# Patient Record
Sex: MALE | Race: OTHER | HISPANIC OR LATINO | URBAN - METROPOLITAN AREA
[De-identification: names, ages, dates, MRNs, and addresses within clinical notes are randomized per-mention and may not be internally consistent; named-entity substitution may affect disease eponyms.]

---

## 2023-11-22 ENCOUNTER — INPATIENT (INPATIENT)
Facility: HOSPITAL | Age: 68
LOS: 1 days | Discharge: HOME | DRG: 551 | End: 2023-11-24
Attending: STUDENT IN AN ORGANIZED HEALTH CARE EDUCATION/TRAINING PROGRAM | Admitting: STUDENT IN AN ORGANIZED HEALTH CARE EDUCATION/TRAINING PROGRAM
Payer: COMMERCIAL

## 2023-11-22 VITALS
TEMPERATURE: 99 F | OXYGEN SATURATION: 97 % | DIASTOLIC BLOOD PRESSURE: 89 MMHG | HEART RATE: 71 BPM | RESPIRATION RATE: 18 BRPM | WEIGHT: 179.9 LBS | SYSTOLIC BLOOD PRESSURE: 146 MMHG

## 2023-11-22 DIAGNOSIS — I10 ESSENTIAL (PRIMARY) HYPERTENSION: ICD-10-CM

## 2023-11-22 DIAGNOSIS — R26.2 DIFFICULTY IN WALKING, NOT ELSEWHERE CLASSIFIED: ICD-10-CM

## 2023-11-22 DIAGNOSIS — E78.5 HYPERLIPIDEMIA, UNSPECIFIED: ICD-10-CM

## 2023-11-22 DIAGNOSIS — N17.9 ACUTE KIDNEY FAILURE, UNSPECIFIED: ICD-10-CM

## 2023-11-22 DIAGNOSIS — E11.9 TYPE 2 DIABETES MELLITUS WITHOUT COMPLICATIONS: ICD-10-CM

## 2023-11-22 DIAGNOSIS — I63.9 CEREBRAL INFARCTION, UNSPECIFIED: ICD-10-CM

## 2023-11-22 DIAGNOSIS — R53.1 WEAKNESS: ICD-10-CM

## 2023-11-22 DIAGNOSIS — R10.9 UNSPECIFIED ABDOMINAL PAIN: ICD-10-CM

## 2023-11-22 DIAGNOSIS — Z29.9 ENCOUNTER FOR PROPHYLACTIC MEASURES, UNSPECIFIED: ICD-10-CM

## 2023-11-22 DIAGNOSIS — N20.0 CALCULUS OF KIDNEY: ICD-10-CM

## 2023-11-22 DIAGNOSIS — M54.9 DORSALGIA, UNSPECIFIED: ICD-10-CM

## 2023-11-22 LAB
ACETONE SERPL-MCNC: NEGATIVE — SIGNIFICANT CHANGE UP
ACETONE SERPL-MCNC: NEGATIVE — SIGNIFICANT CHANGE UP
ALBUMIN SERPL ELPH-MCNC: 3.6 G/DL — SIGNIFICANT CHANGE UP (ref 3.5–5)
ALBUMIN SERPL ELPH-MCNC: 3.6 G/DL — SIGNIFICANT CHANGE UP (ref 3.5–5)
ALP SERPL-CCNC: 161 U/L — HIGH (ref 40–120)
ALP SERPL-CCNC: 161 U/L — HIGH (ref 40–120)
ALT FLD-CCNC: 66 U/L DA — HIGH (ref 10–60)
ALT FLD-CCNC: 66 U/L DA — HIGH (ref 10–60)
ANION GAP SERPL CALC-SCNC: 4 MMOL/L — LOW (ref 5–17)
ANION GAP SERPL CALC-SCNC: 4 MMOL/L — LOW (ref 5–17)
APPEARANCE UR: CLEAR — SIGNIFICANT CHANGE UP
APPEARANCE UR: CLEAR — SIGNIFICANT CHANGE UP
AST SERPL-CCNC: 56 U/L — HIGH (ref 10–40)
AST SERPL-CCNC: 56 U/L — HIGH (ref 10–40)
BACTERIA # UR AUTO: ABNORMAL /HPF
BACTERIA # UR AUTO: ABNORMAL /HPF
BASOPHILS # BLD AUTO: 0.04 K/UL — SIGNIFICANT CHANGE UP (ref 0–0.2)
BASOPHILS # BLD AUTO: 0.04 K/UL — SIGNIFICANT CHANGE UP (ref 0–0.2)
BASOPHILS NFR BLD AUTO: 0.7 % — SIGNIFICANT CHANGE UP (ref 0–2)
BASOPHILS NFR BLD AUTO: 0.7 % — SIGNIFICANT CHANGE UP (ref 0–2)
BILIRUB SERPL-MCNC: 0.5 MG/DL — SIGNIFICANT CHANGE UP (ref 0.2–1.2)
BILIRUB SERPL-MCNC: 0.5 MG/DL — SIGNIFICANT CHANGE UP (ref 0.2–1.2)
BILIRUB UR-MCNC: NEGATIVE — SIGNIFICANT CHANGE UP
BILIRUB UR-MCNC: NEGATIVE — SIGNIFICANT CHANGE UP
BUN SERPL-MCNC: 33 MG/DL — HIGH (ref 7–18)
BUN SERPL-MCNC: 33 MG/DL — HIGH (ref 7–18)
CALCIUM SERPL-MCNC: 9 MG/DL — SIGNIFICANT CHANGE UP (ref 8.4–10.5)
CALCIUM SERPL-MCNC: 9 MG/DL — SIGNIFICANT CHANGE UP (ref 8.4–10.5)
CHLORIDE SERPL-SCNC: 105 MMOL/L — SIGNIFICANT CHANGE UP (ref 96–108)
CHLORIDE SERPL-SCNC: 105 MMOL/L — SIGNIFICANT CHANGE UP (ref 96–108)
CK SERPL-CCNC: 255 U/L — HIGH (ref 35–232)
CK SERPL-CCNC: 255 U/L — HIGH (ref 35–232)
CO2 SERPL-SCNC: 27 MMOL/L — SIGNIFICANT CHANGE UP (ref 22–31)
CO2 SERPL-SCNC: 27 MMOL/L — SIGNIFICANT CHANGE UP (ref 22–31)
COLOR SPEC: YELLOW — SIGNIFICANT CHANGE UP
COLOR SPEC: YELLOW — SIGNIFICANT CHANGE UP
COMMENT - URINE: SIGNIFICANT CHANGE UP
COMMENT - URINE: SIGNIFICANT CHANGE UP
CREAT SERPL-MCNC: 2.07 MG/DL — HIGH (ref 0.5–1.3)
CREAT SERPL-MCNC: 2.07 MG/DL — HIGH (ref 0.5–1.3)
DIFF PNL FLD: ABNORMAL
DIFF PNL FLD: ABNORMAL
EGFR: 34 ML/MIN/1.73M2 — LOW
EGFR: 34 ML/MIN/1.73M2 — LOW
EOSINOPHIL # BLD AUTO: 0.11 K/UL — SIGNIFICANT CHANGE UP (ref 0–0.5)
EOSINOPHIL # BLD AUTO: 0.11 K/UL — SIGNIFICANT CHANGE UP (ref 0–0.5)
EOSINOPHIL NFR BLD AUTO: 1.8 % — SIGNIFICANT CHANGE UP (ref 0–6)
EOSINOPHIL NFR BLD AUTO: 1.8 % — SIGNIFICANT CHANGE UP (ref 0–6)
EPI CELLS # UR: PRESENT
EPI CELLS # UR: PRESENT
GLUCOSE BLDC GLUCOMTR-MCNC: 132 MG/DL — HIGH (ref 70–99)
GLUCOSE BLDC GLUCOMTR-MCNC: 132 MG/DL — HIGH (ref 70–99)
GLUCOSE SERPL-MCNC: 76 MG/DL — SIGNIFICANT CHANGE UP (ref 70–99)
GLUCOSE SERPL-MCNC: 76 MG/DL — SIGNIFICANT CHANGE UP (ref 70–99)
GLUCOSE UR QL: >=1000 MG/DL
GLUCOSE UR QL: >=1000 MG/DL
GRAN CASTS # UR COMP ASSIST: PRESENT
GRAN CASTS # UR COMP ASSIST: PRESENT
HCT VFR BLD CALC: 39.3 % — SIGNIFICANT CHANGE UP (ref 39–50)
HCT VFR BLD CALC: 39.3 % — SIGNIFICANT CHANGE UP (ref 39–50)
HGB BLD-MCNC: 13.3 G/DL — SIGNIFICANT CHANGE UP (ref 13–17)
HGB BLD-MCNC: 13.3 G/DL — SIGNIFICANT CHANGE UP (ref 13–17)
HYALINE CASTS # UR AUTO: PRESENT
HYALINE CASTS # UR AUTO: PRESENT
IMM GRANULOCYTES NFR BLD AUTO: 0.5 % — SIGNIFICANT CHANGE UP (ref 0–0.9)
IMM GRANULOCYTES NFR BLD AUTO: 0.5 % — SIGNIFICANT CHANGE UP (ref 0–0.9)
KETONES UR-MCNC: ABNORMAL MG/DL
KETONES UR-MCNC: ABNORMAL MG/DL
LEUKOCYTE ESTERASE UR-ACNC: NEGATIVE — SIGNIFICANT CHANGE UP
LEUKOCYTE ESTERASE UR-ACNC: NEGATIVE — SIGNIFICANT CHANGE UP
LYMPHOCYTES # BLD AUTO: 1.51 K/UL — SIGNIFICANT CHANGE UP (ref 1–3.3)
LYMPHOCYTES # BLD AUTO: 1.51 K/UL — SIGNIFICANT CHANGE UP (ref 1–3.3)
LYMPHOCYTES # BLD AUTO: 25.3 % — SIGNIFICANT CHANGE UP (ref 13–44)
LYMPHOCYTES # BLD AUTO: 25.3 % — SIGNIFICANT CHANGE UP (ref 13–44)
MAGNESIUM SERPL-MCNC: 2.1 MG/DL — SIGNIFICANT CHANGE UP (ref 1.6–2.6)
MAGNESIUM SERPL-MCNC: 2.1 MG/DL — SIGNIFICANT CHANGE UP (ref 1.6–2.6)
MCHC RBC-ENTMCNC: 28.5 PG — SIGNIFICANT CHANGE UP (ref 27–34)
MCHC RBC-ENTMCNC: 28.5 PG — SIGNIFICANT CHANGE UP (ref 27–34)
MCHC RBC-ENTMCNC: 33.8 GM/DL — SIGNIFICANT CHANGE UP (ref 32–36)
MCHC RBC-ENTMCNC: 33.8 GM/DL — SIGNIFICANT CHANGE UP (ref 32–36)
MCV RBC AUTO: 84.2 FL — SIGNIFICANT CHANGE UP (ref 80–100)
MCV RBC AUTO: 84.2 FL — SIGNIFICANT CHANGE UP (ref 80–100)
MONOCYTES # BLD AUTO: 0.94 K/UL — HIGH (ref 0–0.9)
MONOCYTES # BLD AUTO: 0.94 K/UL — HIGH (ref 0–0.9)
MONOCYTES NFR BLD AUTO: 15.7 % — HIGH (ref 2–14)
MONOCYTES NFR BLD AUTO: 15.7 % — HIGH (ref 2–14)
NEUTROPHILS # BLD AUTO: 3.35 K/UL — SIGNIFICANT CHANGE UP (ref 1.8–7.4)
NEUTROPHILS # BLD AUTO: 3.35 K/UL — SIGNIFICANT CHANGE UP (ref 1.8–7.4)
NEUTROPHILS NFR BLD AUTO: 56 % — SIGNIFICANT CHANGE UP (ref 43–77)
NEUTROPHILS NFR BLD AUTO: 56 % — SIGNIFICANT CHANGE UP (ref 43–77)
NITRITE UR-MCNC: NEGATIVE — SIGNIFICANT CHANGE UP
NITRITE UR-MCNC: NEGATIVE — SIGNIFICANT CHANGE UP
NRBC # BLD: 0 /100 WBCS — SIGNIFICANT CHANGE UP (ref 0–0)
NRBC # BLD: 0 /100 WBCS — SIGNIFICANT CHANGE UP (ref 0–0)
OSMOLALITY SERPL: 292 MOSMOL/KG — SIGNIFICANT CHANGE UP (ref 280–301)
OSMOLALITY SERPL: 292 MOSMOL/KG — SIGNIFICANT CHANGE UP (ref 280–301)
PH UR: 5 — SIGNIFICANT CHANGE UP (ref 5–8)
PH UR: 5 — SIGNIFICANT CHANGE UP (ref 5–8)
PLATELET # BLD AUTO: 117 K/UL — LOW (ref 150–400)
PLATELET # BLD AUTO: 117 K/UL — LOW (ref 150–400)
POTASSIUM SERPL-MCNC: 4.3 MMOL/L — SIGNIFICANT CHANGE UP (ref 3.5–5.3)
POTASSIUM SERPL-MCNC: 4.3 MMOL/L — SIGNIFICANT CHANGE UP (ref 3.5–5.3)
POTASSIUM SERPL-SCNC: 4.3 MMOL/L — SIGNIFICANT CHANGE UP (ref 3.5–5.3)
POTASSIUM SERPL-SCNC: 4.3 MMOL/L — SIGNIFICANT CHANGE UP (ref 3.5–5.3)
PROT SERPL-MCNC: 6.8 G/DL — SIGNIFICANT CHANGE UP (ref 6–8.3)
PROT SERPL-MCNC: 6.8 G/DL — SIGNIFICANT CHANGE UP (ref 6–8.3)
PROT UR-MCNC: 300 MG/DL
PROT UR-MCNC: 300 MG/DL
RBC # BLD: 4.67 M/UL — SIGNIFICANT CHANGE UP (ref 4.2–5.8)
RBC # BLD: 4.67 M/UL — SIGNIFICANT CHANGE UP (ref 4.2–5.8)
RBC # FLD: 13.7 % — SIGNIFICANT CHANGE UP (ref 10.3–14.5)
RBC # FLD: 13.7 % — SIGNIFICANT CHANGE UP (ref 10.3–14.5)
RBC CASTS # UR COMP ASSIST: 25 /HPF — HIGH (ref 0–4)
RBC CASTS # UR COMP ASSIST: 25 /HPF — HIGH (ref 0–4)
SODIUM SERPL-SCNC: 136 MMOL/L — SIGNIFICANT CHANGE UP (ref 135–145)
SODIUM SERPL-SCNC: 136 MMOL/L — SIGNIFICANT CHANGE UP (ref 135–145)
SP GR SPEC: 1.02 — SIGNIFICANT CHANGE UP (ref 1–1.03)
SP GR SPEC: 1.02 — SIGNIFICANT CHANGE UP (ref 1–1.03)
TROPONIN I, HIGH SENSITIVITY RESULT: 35.5 NG/L — SIGNIFICANT CHANGE UP
TROPONIN I, HIGH SENSITIVITY RESULT: 35.5 NG/L — SIGNIFICANT CHANGE UP
UROBILINOGEN FLD QL: 0.2 E.U./DL — SIGNIFICANT CHANGE UP (ref 0.2–1)
UROBILINOGEN FLD QL: 0.2 E.U./DL — SIGNIFICANT CHANGE UP (ref 0.2–1)
WBC # BLD: 5.98 K/UL — SIGNIFICANT CHANGE UP (ref 3.8–10.5)
WBC # BLD: 5.98 K/UL — SIGNIFICANT CHANGE UP (ref 3.8–10.5)
WBC # FLD AUTO: 5.98 K/UL — SIGNIFICANT CHANGE UP (ref 3.8–10.5)
WBC # FLD AUTO: 5.98 K/UL — SIGNIFICANT CHANGE UP (ref 3.8–10.5)
WBC UR QL: 5 /HPF — SIGNIFICANT CHANGE UP (ref 0–5)
WBC UR QL: 5 /HPF — SIGNIFICANT CHANGE UP (ref 0–5)

## 2023-11-22 PROCEDURE — 72131 CT LUMBAR SPINE W/O DYE: CPT | Mod: 26,MA

## 2023-11-22 PROCEDURE — 71045 X-RAY EXAM CHEST 1 VIEW: CPT | Mod: 26

## 2023-11-22 PROCEDURE — 73620 X-RAY EXAM OF FOOT: CPT | Mod: 26,RT

## 2023-11-22 PROCEDURE — 99223 1ST HOSP IP/OBS HIGH 75: CPT | Mod: GC

## 2023-11-22 PROCEDURE — 99285 EMERGENCY DEPT VISIT HI MDM: CPT

## 2023-11-22 RX ORDER — INSULIN LISPRO 100/ML
VIAL (ML) SUBCUTANEOUS
Refills: 0 | Status: DISCONTINUED | OUTPATIENT
Start: 2023-11-22 | End: 2023-11-24

## 2023-11-22 RX ORDER — NIFEDIPINE 30 MG
1 TABLET, EXTENDED RELEASE 24 HR ORAL
Refills: 0 | DISCHARGE

## 2023-11-22 RX ORDER — NIFEDIPINE 30 MG
90 TABLET, EXTENDED RELEASE 24 HR ORAL DAILY
Refills: 0 | Status: DISCONTINUED | OUTPATIENT
Start: 2023-11-22 | End: 2023-11-22

## 2023-11-22 RX ORDER — ATORVASTATIN CALCIUM 80 MG/1
1 TABLET, FILM COATED ORAL
Refills: 0 | DISCHARGE

## 2023-11-22 RX ORDER — INSULIN GLARGINE 100 [IU]/ML
10 INJECTION, SOLUTION SUBCUTANEOUS AT BEDTIME
Refills: 0 | Status: DISCONTINUED | OUTPATIENT
Start: 2023-11-22 | End: 2023-11-23

## 2023-11-22 RX ORDER — CLOPIDOGREL BISULFATE 75 MG/1
1 TABLET, FILM COATED ORAL
Refills: 0 | DISCHARGE

## 2023-11-22 RX ORDER — ONDANSETRON 8 MG/1
4 TABLET, FILM COATED ORAL ONCE
Refills: 0 | Status: COMPLETED | OUTPATIENT
Start: 2023-11-22 | End: 2023-11-22

## 2023-11-22 RX ORDER — CLOPIDOGREL BISULFATE 75 MG/1
75 TABLET, FILM COATED ORAL DAILY
Refills: 0 | Status: DISCONTINUED | OUTPATIENT
Start: 2023-11-22 | End: 2023-11-24

## 2023-11-22 RX ORDER — ENOXAPARIN SODIUM 100 MG/ML
40 INJECTION SUBCUTANEOUS EVERY 24 HOURS
Refills: 0 | Status: DISCONTINUED | OUTPATIENT
Start: 2023-11-22 | End: 2023-11-24

## 2023-11-22 RX ORDER — INSULIN GLARGINE 100 [IU]/ML
10 INJECTION, SOLUTION SUBCUTANEOUS
Refills: 0 | DISCHARGE

## 2023-11-22 RX ORDER — LOSARTAN POTASSIUM 100 MG/1
1 TABLET, FILM COATED ORAL
Refills: 0 | DISCHARGE

## 2023-11-22 RX ORDER — ACETAMINOPHEN 500 MG
650 TABLET ORAL EVERY 6 HOURS
Refills: 0 | Status: DISCONTINUED | OUTPATIENT
Start: 2023-11-22 | End: 2023-11-24

## 2023-11-22 RX ORDER — ATORVASTATIN CALCIUM 80 MG/1
80 TABLET, FILM COATED ORAL AT BEDTIME
Refills: 0 | Status: DISCONTINUED | OUTPATIENT
Start: 2023-11-22 | End: 2023-11-24

## 2023-11-22 RX ORDER — MORPHINE SULFATE 50 MG/1
4 CAPSULE, EXTENDED RELEASE ORAL ONCE
Refills: 0 | Status: DISCONTINUED | OUTPATIENT
Start: 2023-11-22 | End: 2023-11-22

## 2023-11-22 RX ORDER — SODIUM CHLORIDE 9 MG/ML
1000 INJECTION, SOLUTION INTRAVENOUS
Refills: 0 | Status: DISCONTINUED | OUTPATIENT
Start: 2023-11-22 | End: 2023-11-23

## 2023-11-22 RX ORDER — NIFEDIPINE 30 MG
90 TABLET, EXTENDED RELEASE 24 HR ORAL DAILY
Refills: 0 | Status: DISCONTINUED | OUTPATIENT
Start: 2023-11-22 | End: 2023-11-24

## 2023-11-22 RX ORDER — TAMSULOSIN HYDROCHLORIDE 0.4 MG/1
0.4 CAPSULE ORAL AT BEDTIME
Refills: 0 | Status: DISCONTINUED | OUTPATIENT
Start: 2023-11-22 | End: 2023-11-24

## 2023-11-22 RX ORDER — EMPAGLIFLOZIN 10 MG/1
1 TABLET, FILM COATED ORAL
Refills: 0 | DISCHARGE

## 2023-11-22 RX ORDER — ACETAMINOPHEN 500 MG
1000 TABLET ORAL ONCE
Refills: 0 | Status: COMPLETED | OUTPATIENT
Start: 2023-11-22 | End: 2023-11-22

## 2023-11-22 RX ADMIN — Medication 400 MILLIGRAM(S): at 14:18

## 2023-11-22 RX ADMIN — INSULIN GLARGINE 10 UNIT(S): 100 INJECTION, SOLUTION SUBCUTANEOUS at 22:11

## 2023-11-22 RX ADMIN — TAMSULOSIN HYDROCHLORIDE 0.4 MILLIGRAM(S): 0.4 CAPSULE ORAL at 22:12

## 2023-11-22 RX ADMIN — Medication 1000 MILLIGRAM(S): at 17:48

## 2023-11-22 RX ADMIN — ENOXAPARIN SODIUM 40 MILLIGRAM(S): 100 INJECTION SUBCUTANEOUS at 22:12

## 2023-11-22 RX ADMIN — SODIUM CHLORIDE 75 MILLILITER(S): 9 INJECTION, SOLUTION INTRAVENOUS at 21:12

## 2023-11-22 RX ADMIN — ATORVASTATIN CALCIUM 80 MILLIGRAM(S): 80 TABLET, FILM COATED ORAL at 22:08

## 2023-11-22 NOTE — H&P ADULT - PROBLEM SELECTOR PLAN 7
c/w home medication of Atorvastatin 80 c/w home medication of nifedipine.  hold home medication of losartan due to nadya

## 2023-11-22 NOTE — ED ADULT NURSE REASSESSMENT NOTE - NS ED NURSE REASSESS COMMENT FT1
RN from 4th floor called for report. Pt was upstairs and in his bed. I have not met this pt nor did I call for transport. Pt was taken up without my knowledge.

## 2023-11-22 NOTE — ED ADULT NURSE NOTE - NSFALLRISKINTERV_ED_ALL_ED

## 2023-11-22 NOTE — H&P ADULT - ASSESSMENT
68-year-old male, ambulates with a cane, with pmhx  of HTN, HLD, DM, CVA with left-sided weakness in 1/23, TIA x 2,  was brought into the hospital due to two recent falls. Both times patient felt extreme back and leg pain causing him to fall once last night and also this morning in the shower. CT lumbar showed multilevel lumbar spondylosis, L4-L5 spinal stenosis , mid right femoral narrowing  L2-S1. Patient is being admitted for back pain and ambulatory dysfunction.

## 2023-11-22 NOTE — H&P ADULT - PROBLEM SELECTOR PLAN 1
p/w two recent falls due to back pain radiating down to his legs prior to fall . Also reports weakness in his legs due to pain.  concern for sciatic pain due to + straight leg test.   Ct lumbar: multilevel lumbar spondylosis, L4-L5 spinal stenosis , mid right femoral narrowing  L2-S1.   PT consulted  SW consulted as daughter wants patient to go to a rehab. p/w two recent falls due to back pain radiating down to his legs prior to fall . Also reports weakness in his legs due to pain.  concern for sciatic pain due to + straight leg test.   Ct lumbar: multilevel lumbar spondylosis, L4-L5 spinal stenosis , mid right femoral narrowing  L2-S1.   PT consulted  SW consulted as daughter wants patient to go to a rehab.  c/w Tylenol for pain

## 2023-11-22 NOTE — H&P ADULT - NSICDXPASTMEDICALHX_GEN_ALL_CORE_FT
PAST MEDICAL HISTORY:  Cerebrovascular accident (CVA)     DM (diabetes mellitus)     HLD (hyperlipidemia)     HTN (hypertension)

## 2023-11-22 NOTE — ED PROVIDER NOTE - CLINICAL SUMMARY MEDICAL DECISION MAKING FREE TEXT BOX
Patient with history of DM, CVA left-sided weakness, HTN, now with lower back pain that radiates down his legs and weakness.  Concern for sciatica, lower back pathology, will get labs, CT lumbar spine, give meds and admission

## 2023-11-22 NOTE — ED PROVIDER NOTE - PROGRESS NOTE DETAILS
Labs/CT explained to pt & daughter  Pt still c/o leg weakness, unable to ambulate, will admit for rehab  case d/w Dr. Singh, will admit

## 2023-11-22 NOTE — ED ADULT TRIAGE NOTE - CHIEF COMPLAINT QUOTE
BIBA from home c/o B/L LE weakness X2 days, lower back pain, slid off bed twice last night, denies head injury/LOC  Hx stroke 1/2023 with LT side residual, HTN, DM, LT charcot foot

## 2023-11-22 NOTE — H&P ADULT - PROBLEM SELECTOR PLAN 4
BUN 33 and creatin 2.07  unknown duration of abnormal kidney function.    Was told at his primary care doctor last time that his kidney levels were elevated.   Is seeing his primary next month.   avoid nephrotoxic agents.   monitor bmp has a boot on due to Charcot arthropathy on the right foot/leg.   consulted podiatry as he has not followed up with his podiatrist in a while.  rest see above

## 2023-11-22 NOTE — H&P ADULT - PROBLEM SELECTOR PLAN 6
c/w home medication of nifedipine.  hold home medication of losartan due to nadya hx of cva on 1/23.  has generalized weakness on the left side  c/w clopidogrel and atorvastatin.

## 2023-11-22 NOTE — H&P ADULT - HISTORY OF PRESENT ILLNESS
68-year-old male, ambulates with a cane, with pmhx  of HTN, HLD, DM, CVA with left-sided weakness in 1/23, TIA x 2,  was brought into the hospital due to two recent falls. Both times patient felt extreme back and leg pain causing him to fall once last night and also this morning in the shower. Both times he did not hit his head or LOC and guided himself down. Denies any lightheadedness of presyncope symptoms prior or after the fall.  Patient reports the back pain only started two days ago and describes it as mid back pain radiating  to his lower back and  down his legs. He denies any urinary or fecal incontinence or saddle anesthesia. He took Tylenol this am with a little relief.  Patient lives alone in a rented room and daughter is concerned and wants him to enroll in a rehab program.

## 2023-11-22 NOTE — ED PROVIDER NOTE - CARE PLAN
Principal Discharge DX:	Inability to walk  Secondary Diagnosis:	Acute sciatica  Secondary Diagnosis:	ANISA (acute kidney injury)   1

## 2023-11-22 NOTE — ED PROVIDER NOTE - OBJECTIVE STATEMENT
68-year-old male with history of HTN, HLD, DM, CVA with left-sided weakness in 1/23, TIA x 2, BIBA as per daughter, patient rented a room in an apartment, he normally walks with a cane.  Last night, patient was trying to get up, slipped off the bed and was unable to get up.  Before the appointment helped him up.  This morning when trying to get out of the bathtub after shower, patient again slipped off and landed on the floor.  Patient complaining of mid back pain radiated to his lower back down his leg since yesterday.  He denies any injury, incontinence, saddle anesthesia, fever, dysuria.  Patient admits to pain in his worse with movement, weakness.  He took Tylenol this am with a little relief.  Patient's daughter is concerned and wants him to enroll in a rehab program 68-year-old male with history of HTN, HLD, DM, CVA with left-sided weakness in 1/23, TIA x 2, BIBA as per daughter, patient rented a room in an apartment, he normally walks with a cane.  Last night, patient was trying to get up, slid off the bed and was unable to get up.  Before the appointment helped him up.  This morning when trying to get out of the bathtub after shower, patient again slid off and landed on the floor.  Patient complaining of mid back pain radiated to his lower back down his leg since yesterday.  He denies any injury, incontinence, saddle anesthesia, fever, dysuria.  Patient admits to pain in his worse with movement, weakness.  He took Tylenol this am with a little relief.  Patient's daughter is concerned and wants him to enroll in a rehab program

## 2023-11-22 NOTE — H&P ADULT - PROBLEM SELECTOR PLAN 8
takes Jardiance and 10 units of lanatus at night and morning  started on 10 units of lanatus at night and sliding scale  monitor sugars c/w home medication of Atorvastatin 80

## 2023-11-22 NOTE — H&P ADULT - PROBLEM SELECTOR PLAN 5
hx of cva on 1/23.  has generalized weakness on the left side  c/w clopidogrel and atorvastatin. BUN 33 and creatin 2.07  unknown duration of abnormal kidney function.    Was told at his primary care doctor last time that his kidney levels were elevated.   Is seeing his primary next month.   avoid nephrotoxic agents.   monitor bmp

## 2023-11-22 NOTE — ED ADULT NURSE NOTE - OBJECTIVE STATEMENT
Pt c/o bilateral LE weakness X2 days, lower back pain after falling off bed last night x2. Pt denies head injury, LOC. Pt endorses thinner use.  Pt hx of stroke with left side residual and left charcot foot, brace in place. Respirations even and unlabored, skin warm and dry. Pt with no further complaints at this time, no distress noted.

## 2023-11-22 NOTE — H&P ADULT - ATTENDING COMMENTS
68-year-old male, ambulates with a cane, with pmhx  of HTN, HLD, DM, CVA with left-sided weakness in 1/23, TIA x 2,  was brought into the hospital due to two recent falls. Admit for ambulatory dysfunction and fall 2/2 multilevel lumbar spondylosis and spinal stenosis, ANISA on CKD vs CKD.    # Ambulatory dysfunction and fall 2/2 multilevel lumbar spondylosis and spinal stenosis, ANISA vs CD.  #ANISA on CKD vs CKD  #DM  #Hx CVA, HTN, HLD  #Charcot's R foot  CT lumbar reviewed and showed multilevel lumbar spondylosis, L4-L5 spinal stenosis , mid right femoral narrowing  L2-S1. Patient is being admitted for back pain and ambulatory dysfunction. Patient unable to ambulate on own in ED.  - Tylenol for Pain  - PT consult  - Podiatry consult for charcot's  - CBC, BMP and UA reviewed - Elevated Cr 2  - Unclear baseline for CKD, will monitor Cr and UOP   -hold losartan and monitor  - Continue lantus 10U and SSI - monitor and increase as neccessary  - DVT ppx 68-year-old male, ambulates with a cane, with pmhx  of HTN, HLD, DM, CVA with left-sided weakness in 1/23, TIA x 2,  was brought into the hospital due to two recent falls. Admit for ambulatory dysfunction and fall 2/2 multilevel lumbar spondylosis and spinal stenosis, ANISA on CKD vs CKD.    # Ambulatory dysfunction and fall 2/2 multilevel lumbar spondylosis and spinal stenosis, ANISA vs CD.  #ANISA on CKD vs CKD  #DM  #Microscopic hematuria  #Hx CVA, HTN, HLD  #Charcot's R foot  CT lumbar reviewed and showed multilevel lumbar spondylosis, L4-L5 spinal stenosis , mid right femoral narrowing  L2-S1. Patient is being admitted for back pain and ambulatory dysfunction. Patient unable to ambulate on own in ED.  - Tylenol for Pain  - PT consult  - Podiatry consult for charcot's  - CBC, BMP and UA reviewed - Elevated Cr 2  - Microscopic hematuria on UA, CT showing possible R kidney stone   - IVF hydration and flomax   - bladder renal US  - Unclear baseline for CKD, will monitor Cr and UOP   -hold losartan and monitor  - Continue lantus 10U and SSI - monitor and increase as neccessary  - DVT ppx

## 2023-11-22 NOTE — H&P ADULT - PROBLEM SELECTOR PLAN 3
see above has a boot on due to Charcot arthropathy on the right foot/leg.   consulted podiatry as he has not followed up with his podiatrist in a while.  rest see above Ct scan shows possible kidney stone  possible cause for new onset back pain  started on maintenance fluids and flomax  f/u renal ultrasound Ct scan shows high suspicion of kidney stone  possible cause for new onset back pain  started on maintenance fluids and flomax  f/u renal ultrasound Ct scan shows high suspicion of right kidney stone  possible cause for new onset back pain  started on maintenance fluids and flomax  f/u renal ultrasound

## 2023-11-22 NOTE — ED PROVIDER NOTE - MUSCULOSKELETAL, MLM
Spine appears normal, range of motion is not limited, no CVAT, mid/lower back-tenderness to palp., straight leg- Lt 75 deg., Rt 90 deg.  Rt foot- charcot foot

## 2023-11-22 NOTE — H&P ADULT - PROBLEM SELECTOR PLAN 9
DVT: lovenox takes Jardiance and 10 units of lanatus at night and morning  started on 10 units of lanatus at night and sliding scale  monitor sugars

## 2023-11-22 NOTE — H&P ADULT - NSHPREVIEWOFSYSTEMS_GEN_ALL_CORE
REVIEW OF SYSTEMS:    CONSTITUTIONAL: No fever, chills, or weakness.   EYES/ENT: No visual changes;  No ear pain, runny nose, or sore throat.   NECK: No pain or stiffness.  RESPIRATORY: No cough, wheezing, hemoptysis; No shortness of breath.  CARDIOVASCULAR: No chest pain, dyspnea on exertion, or palpitations.  GASTROINTESTINAL: No abdominal or epigastric pain. No nausea, vomiting, or hematemesis; No diarrhea or constipation. No melena or hematochezia.  GENITOURINARY: No dysuria, frequency or hematuria.  NEUROLOGICAL:+ weakness and back pain   SKIN: No itching, rashes.

## 2023-11-22 NOTE — H&P ADULT - NSHPPHYSICALEXAM_GEN_ALL_CORE
T(C): 36.9 (11-22-23 @ 15:59), Max: 37.3 (11-22-23 @ 13:08)  HR: 71 (11-22-23 @ 15:59) (71 - 71)  BP: 128/66 (11-22-23 @ 15:59) (128/66 - 146/89)  RR: 18 (11-22-23 @ 15:59) (18 - 18)  SpO2: 96% (11-22-23 @ 15:59) (96% - 97%)    CONSTITUTIONAL: Well groomed, no apparent distress  EYES: PERRLA and symmetric, EOMI, No conjunctival or scleral injection, non-icteric  RESP: No respiratory distress, no use of accessory muscles; CTA b/l, no WRR  CV: RRR, +S1S2, no MRG; no JVD; no peripheral edema  GI: Soft, NT, ND, no rebound, no guarding; no palpable masses; no hepatosplenomegaly; no hernia palpated  MSk: weakness on the left side and positive straight leg test. Wears a boot of right leg.   PSYCH: Appropriate insight/judgment; A+O x 3, mood and affect appropriate, recent/remote memory intact

## 2023-11-23 LAB
A1C WITH ESTIMATED AVERAGE GLUCOSE RESULT: 6.5 % — HIGH (ref 4–5.6)
A1C WITH ESTIMATED AVERAGE GLUCOSE RESULT: 6.5 % — HIGH (ref 4–5.6)
ANION GAP SERPL CALC-SCNC: 6 MMOL/L — SIGNIFICANT CHANGE UP (ref 5–17)
ANION GAP SERPL CALC-SCNC: 6 MMOL/L — SIGNIFICANT CHANGE UP (ref 5–17)
BUN SERPL-MCNC: 37 MG/DL — HIGH (ref 7–18)
BUN SERPL-MCNC: 37 MG/DL — HIGH (ref 7–18)
CALCIUM SERPL-MCNC: 8.2 MG/DL — LOW (ref 8.4–10.5)
CALCIUM SERPL-MCNC: 8.2 MG/DL — LOW (ref 8.4–10.5)
CHLORIDE SERPL-SCNC: 107 MMOL/L — SIGNIFICANT CHANGE UP (ref 96–108)
CHLORIDE SERPL-SCNC: 107 MMOL/L — SIGNIFICANT CHANGE UP (ref 96–108)
CO2 SERPL-SCNC: 24 MMOL/L — SIGNIFICANT CHANGE UP (ref 22–31)
CO2 SERPL-SCNC: 24 MMOL/L — SIGNIFICANT CHANGE UP (ref 22–31)
CREAT SERPL-MCNC: 1.87 MG/DL — HIGH (ref 0.5–1.3)
CREAT SERPL-MCNC: 1.87 MG/DL — HIGH (ref 0.5–1.3)
CRP SERPL-MCNC: 10 MG/L — HIGH
CRP SERPL-MCNC: 10 MG/L — HIGH
CULTURE RESULTS: SIGNIFICANT CHANGE UP
CULTURE RESULTS: SIGNIFICANT CHANGE UP
EGFR: 38 ML/MIN/1.73M2 — LOW
EGFR: 38 ML/MIN/1.73M2 — LOW
ERYTHROCYTE [SEDIMENTATION RATE] IN BLOOD: 10 MM/HR — SIGNIFICANT CHANGE UP (ref 0–20)
ERYTHROCYTE [SEDIMENTATION RATE] IN BLOOD: 10 MM/HR — SIGNIFICANT CHANGE UP (ref 0–20)
ESTIMATED AVERAGE GLUCOSE: 140 MG/DL — HIGH (ref 68–114)
ESTIMATED AVERAGE GLUCOSE: 140 MG/DL — HIGH (ref 68–114)
GLUCOSE BLDC GLUCOMTR-MCNC: 102 MG/DL — HIGH (ref 70–99)
GLUCOSE BLDC GLUCOMTR-MCNC: 102 MG/DL — HIGH (ref 70–99)
GLUCOSE BLDC GLUCOMTR-MCNC: 115 MG/DL — HIGH (ref 70–99)
GLUCOSE BLDC GLUCOMTR-MCNC: 115 MG/DL — HIGH (ref 70–99)
GLUCOSE BLDC GLUCOMTR-MCNC: 122 MG/DL — HIGH (ref 70–99)
GLUCOSE BLDC GLUCOMTR-MCNC: 122 MG/DL — HIGH (ref 70–99)
GLUCOSE BLDC GLUCOMTR-MCNC: 177 MG/DL — HIGH (ref 70–99)
GLUCOSE BLDC GLUCOMTR-MCNC: 177 MG/DL — HIGH (ref 70–99)
GLUCOSE BLDC GLUCOMTR-MCNC: 88 MG/DL — SIGNIFICANT CHANGE UP (ref 70–99)
GLUCOSE BLDC GLUCOMTR-MCNC: 88 MG/DL — SIGNIFICANT CHANGE UP (ref 70–99)
GLUCOSE SERPL-MCNC: 54 MG/DL — CRITICAL LOW (ref 70–99)
GLUCOSE SERPL-MCNC: 54 MG/DL — CRITICAL LOW (ref 70–99)
HCT VFR BLD CALC: 38.7 % — LOW (ref 39–50)
HCT VFR BLD CALC: 38.7 % — LOW (ref 39–50)
HCV AB S/CO SERPL IA: 0.16 S/CO — SIGNIFICANT CHANGE UP (ref 0–0.99)
HCV AB S/CO SERPL IA: 0.16 S/CO — SIGNIFICANT CHANGE UP (ref 0–0.99)
HCV AB SERPL-IMP: SIGNIFICANT CHANGE UP
HCV AB SERPL-IMP: SIGNIFICANT CHANGE UP
HGB BLD-MCNC: 12.7 G/DL — LOW (ref 13–17)
HGB BLD-MCNC: 12.7 G/DL — LOW (ref 13–17)
MAGNESIUM SERPL-MCNC: 2.2 MG/DL — SIGNIFICANT CHANGE UP (ref 1.6–2.6)
MAGNESIUM SERPL-MCNC: 2.2 MG/DL — SIGNIFICANT CHANGE UP (ref 1.6–2.6)
MCHC RBC-ENTMCNC: 28.3 PG — SIGNIFICANT CHANGE UP (ref 27–34)
MCHC RBC-ENTMCNC: 28.3 PG — SIGNIFICANT CHANGE UP (ref 27–34)
MCHC RBC-ENTMCNC: 32.8 GM/DL — SIGNIFICANT CHANGE UP (ref 32–36)
MCHC RBC-ENTMCNC: 32.8 GM/DL — SIGNIFICANT CHANGE UP (ref 32–36)
MCV RBC AUTO: 86.2 FL — SIGNIFICANT CHANGE UP (ref 80–100)
MCV RBC AUTO: 86.2 FL — SIGNIFICANT CHANGE UP (ref 80–100)
NRBC # BLD: 0 /100 WBCS — SIGNIFICANT CHANGE UP (ref 0–0)
NRBC # BLD: 0 /100 WBCS — SIGNIFICANT CHANGE UP (ref 0–0)
PHOSPHATE SERPL-MCNC: 3.5 MG/DL — SIGNIFICANT CHANGE UP (ref 2.5–4.5)
PHOSPHATE SERPL-MCNC: 3.5 MG/DL — SIGNIFICANT CHANGE UP (ref 2.5–4.5)
PLATELET # BLD AUTO: 108 K/UL — LOW (ref 150–400)
PLATELET # BLD AUTO: 108 K/UL — LOW (ref 150–400)
POTASSIUM SERPL-MCNC: 3.8 MMOL/L — SIGNIFICANT CHANGE UP (ref 3.5–5.3)
POTASSIUM SERPL-MCNC: 3.8 MMOL/L — SIGNIFICANT CHANGE UP (ref 3.5–5.3)
POTASSIUM SERPL-SCNC: 3.8 MMOL/L — SIGNIFICANT CHANGE UP (ref 3.5–5.3)
POTASSIUM SERPL-SCNC: 3.8 MMOL/L — SIGNIFICANT CHANGE UP (ref 3.5–5.3)
RAPID RVP RESULT: DETECTED
RAPID RVP RESULT: DETECTED
RBC # BLD: 4.49 M/UL — SIGNIFICANT CHANGE UP (ref 4.2–5.8)
RBC # BLD: 4.49 M/UL — SIGNIFICANT CHANGE UP (ref 4.2–5.8)
RBC # FLD: 13.3 % — SIGNIFICANT CHANGE UP (ref 10.3–14.5)
RBC # FLD: 13.3 % — SIGNIFICANT CHANGE UP (ref 10.3–14.5)
SARS-COV-2 RNA SPEC QL NAA+PROBE: DETECTED
SARS-COV-2 RNA SPEC QL NAA+PROBE: DETECTED
SODIUM SERPL-SCNC: 137 MMOL/L — SIGNIFICANT CHANGE UP (ref 135–145)
SODIUM SERPL-SCNC: 137 MMOL/L — SIGNIFICANT CHANGE UP (ref 135–145)
SPECIMEN SOURCE: SIGNIFICANT CHANGE UP
SPECIMEN SOURCE: SIGNIFICANT CHANGE UP
WBC # BLD: 4.66 K/UL — SIGNIFICANT CHANGE UP (ref 3.8–10.5)
WBC # BLD: 4.66 K/UL — SIGNIFICANT CHANGE UP (ref 3.8–10.5)
WBC # FLD AUTO: 4.66 K/UL — SIGNIFICANT CHANGE UP (ref 3.8–10.5)
WBC # FLD AUTO: 4.66 K/UL — SIGNIFICANT CHANGE UP (ref 3.8–10.5)

## 2023-11-23 PROCEDURE — 99233 SBSQ HOSP IP/OBS HIGH 50: CPT

## 2023-11-23 RX ORDER — DEXTROSE 50 % IN WATER 50 %
25 SYRINGE (ML) INTRAVENOUS ONCE
Refills: 0 | Status: DISCONTINUED | OUTPATIENT
Start: 2023-11-23 | End: 2023-11-23

## 2023-11-23 RX ORDER — INSULIN GLARGINE 100 [IU]/ML
5 INJECTION, SOLUTION SUBCUTANEOUS AT BEDTIME
Refills: 0 | Status: DISCONTINUED | OUTPATIENT
Start: 2023-11-23 | End: 2023-11-24

## 2023-11-23 RX ORDER — BENZOCAINE AND MENTHOL 5; 1 G/100ML; G/100ML
1 LIQUID ORAL
Refills: 0 | Status: DISCONTINUED | OUTPATIENT
Start: 2023-11-23 | End: 2023-11-24

## 2023-11-23 RX ORDER — SODIUM CHLORIDE 9 MG/ML
1000 INJECTION, SOLUTION INTRAVENOUS
Refills: 0 | Status: DISCONTINUED | OUTPATIENT
Start: 2023-11-23 | End: 2023-11-24

## 2023-11-23 RX ADMIN — SODIUM CHLORIDE 75 MILLILITER(S): 9 INJECTION, SOLUTION INTRAVENOUS at 08:40

## 2023-11-23 RX ADMIN — BENZOCAINE AND MENTHOL 1 LOZENGE: 5; 1 LIQUID ORAL at 22:20

## 2023-11-23 RX ADMIN — ENOXAPARIN SODIUM 40 MILLIGRAM(S): 100 INJECTION SUBCUTANEOUS at 22:20

## 2023-11-23 RX ADMIN — Medication 90 MILLIGRAM(S): at 06:06

## 2023-11-23 RX ADMIN — CLOPIDOGREL BISULFATE 75 MILLIGRAM(S): 75 TABLET, FILM COATED ORAL at 12:35

## 2023-11-23 RX ADMIN — TAMSULOSIN HYDROCHLORIDE 0.4 MILLIGRAM(S): 0.4 CAPSULE ORAL at 22:18

## 2023-11-23 RX ADMIN — ATORVASTATIN CALCIUM 80 MILLIGRAM(S): 80 TABLET, FILM COATED ORAL at 22:19

## 2023-11-23 RX ADMIN — INSULIN GLARGINE 5 UNIT(S): 100 INJECTION, SOLUTION SUBCUTANEOUS at 22:19

## 2023-11-23 RX ADMIN — Medication 650 MILLIGRAM(S): at 07:15

## 2023-11-23 RX ADMIN — Medication 650 MILLIGRAM(S): at 06:05

## 2023-11-23 NOTE — PHYSICAL THERAPY INITIAL EVALUATION ADULT - RANGE OF MOTION EXAMINATION, REHAB EVAL
except for left sh fl to ~ 110 deg with c/o pain at end-range; elbow ext - 10 deg due to tightness/bilateral upper extremity ROM was WFL (within functional limits)/bilateral lower extremity ROM was WFL (within functional limits)

## 2023-11-23 NOTE — PHYSICAL THERAPY INITIAL EVALUATION ADULT - LIVES WITH, PROFILE
Ms. Blunt feels well this AM.  Denies any complaints, she would like to discharge.  Psychiatry as seen her and feels she can discharge.  They adjusted medications and setup follow-up for her early December.  Exam stable.  Full discharge summary to follow.   in a private home, claims to be mod (I) in basic ADLs, ambulates adlib with st cane, owns a rollator; states unable to control the rollator so he is using st cane instead/alone

## 2023-11-23 NOTE — PHYSICAL THERAPY INITIAL EVALUATION ADULT - GAIT DEVIATIONS NOTED, PT EVAL
decreased marco a/increased time in double stance/decreased velocity of limb motion/decreased step length

## 2023-11-23 NOTE — PHYSICAL THERAPY INITIAL EVALUATION ADULT - GENERAL OBSERVATIONS, REHAB EVAL
Pt seen sitting in chair with IV, was cooperative during assessment, (+) right foot deformity,  uses camboot at baseline

## 2023-11-23 NOTE — DISCHARGE NOTE PROVIDER - HOSPITAL COURSE
68-year-old male, ambulates with a cane, with pmhx  of HTN, HLD, DM, CVA with left-sided weakness in 1/23, TIA x 2,  was brought into the hospital due to two recent falls. Both times patient felt extreme back and leg pain causing him to fall once last night and also this morning in the shower. CT lumbar showed multilevel lumbar spondylosis, L4-L5 spinal stenosis , mid right femoral narrowing  L2-S1. Patient is being admitted for back pain and ambulatory dysfunction. Patient was found to have low grade fevers. Blood cultures grew ??????. No signs of infection is noted. You have a hx of charcot's foot and you were followed by podiatry and they recommended CAM boot/CROW walker. PT recc's ASHLEY.     Given the clinical course, decision was made to discharge patient with outpatient follow up   Discharge plan discussed with attending   This is only a brief summary for the whole hospital course, please follow up with EMR            68-year-old male, ambulates with a cane, with pmhx  of HTN, HLD, DM, CVA with left-sided weakness in 1/23, TIA x 2,  was brought into the hospital due to two recent falls. Both times patient felt extreme back and leg pain causing him to fall once last night and also this morning in the shower. CT lumbar showed multilevel lumbar spondylosis, L4-L5 spinal stenosis , mid right femoral narrowing  L2-S1. Patient is being admitted for back pain and ambulatory dysfunction. Patient was found to have low grade fevers. Blood cultures with no growth to date. No signs of infection is noted. You have a hx of charcot's foot and you were followed by podiatry and they recommended CAM boot/CROW walker.   Of note, +COVID on 11/23/23, which likely explains his fever.  He did not require oxygen and had no symptoms other a mild sore throat that resolved on day of discharge.    Seen by PT and cleared for home with home PT.    Given the clinical course, decision was made to discharge patient with outpatient follow up   Discharge plan discussed with attending   This is only a brief summary for the whole hospital course, please follow up with EMR

## 2023-11-23 NOTE — DISCHARGE NOTE PROVIDER - NSFTFHOMEHTHYNRD_GEN_ALL_CORE
- c/s synthroid at home dose   - TSH wnl c/w protonix. c/w protonix. Continue Tamiflu c/w protonix. - c/s synthroid at home dose   - TSH wnl Yes

## 2023-11-23 NOTE — DISCHARGE NOTE PROVIDER - NSDCCPCAREPLAN_GEN_ALL_CORE_FT
PRINCIPAL DISCHARGE DIAGNOSIS  Diagnosis: Acute sciatica  Assessment and Plan of Treatment: Sciatica refers to pain that travels along the path of the sciatic nerve. The sciatic nerve travels from the lower back through the hips and buttocks and down each leg.  Sciatica most often occurs when a herniated disk or an overgrowth of bone puts pressure on part of the nerve. This causes inflammation, pain and often some numbness in the affected leg.  Risk factors for sciatica include:  Age. Age-related changes in the spine, such as herniated disks and bone spurs, are the most common causes of sciatica.  Occupation. A job that requires twisting the back, carrying heavy loads or driving a motor vehicle for long periods might play a role in sciatica.  Prolonged sitting. People who sit a lot or don't move much are more likely to develop sciatica than active people are.  Mild sciatica usually goes away over time. Call your primary care provider if self-care measures don't ease symptoms. Also call if pain lasts longer than a week, is severe or gets worse.   Get immediate medical care for:  Sudden, severe pain in the low back or a leg and numbness or muscle weakness in a leg  Pain after a violent injury, such as a traffic accident  Trouble controlling bowels or bladder      SECONDARY DISCHARGE DIAGNOSES  Diagnosis: ANISA (acute kidney injury)  Assessment and Plan of Treatment: You creatinine was elevated due to dehydration   Creatinine improved with IV fluids   Avoid taking (NSAIDs) - (ex: Ibuprofen, Advil, Celebrex, Naprosyn)  Avoid taking any nephrotoxic agents (can harm kidneys) - Intravenous contrast for diagnostic testing, combination cold medications.  Have all medications adjusted for your renal function by your Health Care Provider.  Blood pressure control is important.  Take all medication as prescribed.    Diagnosis: Charcot's joint of foot  Assessment and Plan of Treatment: You have a charcot's joint of foot, it is a condition that causes a loss of sensation in the extremities. The affected area becomes weakened leading to deforties and joint dislocations. Podiatry followed your and recommended CAM boot/CROW walker. importance of daily foot examinations and proper shoe gear and to importance of lower fasting glucose        PRINCIPAL DISCHARGE DIAGNOSIS  Diagnosis: Acute sciatica  Assessment and Plan of Treatment: You came in with back pain, weakness, and a fall.  CT scan did not show any fracture.  You were seen by physical therapy and cleared to go home and use a walker.  Sciatica refers to pain that travels along the path of the sciatic nerve. The sciatic nerve travels from the lower back through the hips and buttocks and down each leg.  Sciatica most often occurs when a herniated disk or an overgrowth of bone puts pressure on part of the nerve. This causes inflammation, pain and often some numbness in the affected leg.  Risk factors for sciatica include:  Age. Age-related changes in the spine, such as herniated disks and bone spurs, are the most common causes of sciatica.  Occupation. A job that requires twisting the back, carrying heavy loads or driving a motor vehicle for long periods might play a role in sciatica.  Prolonged sitting. People who sit a lot or don't move much are more likely to develop sciatica than active people are.  Mild sciatica usually goes away over time. Call your primary care provider if self-care measures don't ease symptoms. Also call if pain lasts longer than a week, is severe or gets worse.   Get immediate medical care for:  Sudden, severe pain in the low back or a leg and numbness or muscle weakness in a leg  Pain after a violent injury, such as a traffic accident  Trouble controlling bowels or bladder      SECONDARY DISCHARGE DIAGNOSES  Diagnosis: ANISA (acute kidney injury)  Assessment and Plan of Treatment: You creatinine was elevated due to dehydration   Creatinine improved with IV fluids   Avoid taking (NSAIDs) - (ex: Ibuprofen, Advil, Celebrex, Naprosyn)  Avoid taking any nephrotoxic agents (can harm kidneys) - Intravenous contrast for diagnostic testing, combination cold medications.  Have all medications adjusted for your renal function by your Health Care Provider.  Blood pressure control is important.  Take all medication as prescribed.    Diagnosis: Charcot's joint of foot  Assessment and Plan of Treatment: You have a charcot's joint of foot, it is a condition that causes a loss of sensation in the extremities. The affected area becomes weakened leading to deforties and joint dislocations. Podiatry followed your and recommended CAM boot/CROW walker. importance of daily foot examinations and proper shoe gear and to importance of lower fasting glucose        PRINCIPAL DISCHARGE DIAGNOSIS  Diagnosis: Multilevel spinal stenosis  Assessment and Plan of Treatment: You came in with back pain, weakness, and a fall.  CT scan did not show any fracture. CT scan showed Multilevel lumbar spondylosis most prominent at L4-5 with mild to moderate spinal canal stenosis and right lateral recess narrowing with moderate to severe right and moderate left neural foraminal narrowing. Moderate right neural foraminal narrowing L2-3, L3-4 and L5-S1.   You were seen by physical therapy and cleared to go home and use a walker.  Sciatica refers to pain that travels along the path of the sciatic nerve. The sciatic nerve travels from the lower back through the hips and buttocks and down each leg.  Sciatica most often occurs when a herniated disk or an overgrowth of bone puts pressure on part of the nerve. This causes inflammation, pain and often some numbness in the affected leg.  Risk factors for sciatica include:  Age. Age-related changes in the spine, such as herniated disks and bone spurs, are the most common causes of sciatica.  Occupation. A job that requires twisting the back, carrying heavy loads or driving a motor vehicle for long periods might play a role in sciatica.  Prolonged sitting. People who sit a lot or don't move much are more likely to develop sciatica than active people are.  Mild sciatica usually goes away over time. Call your primary care provider if self-care measures don't ease symptoms. Also call if pain lasts longer than a week, is severe or gets worse.   Get immediate medical care for:  Sudden, severe pain in the low back or a leg and numbness or muscle weakness in a leg  Pain after a violent injury, such as a traffic accident  Trouble controlling bowels or bladder      SECONDARY DISCHARGE DIAGNOSES  Diagnosis: Acute kidney injury superimposed on CKD  Assessment and Plan of Treatment: You creatinine was elevated due to dehydration   Creatinine improved with IV fluids   Avoid taking (NSAIDs) - (ex: Ibuprofen, Advil, Celebrex, Naprosyn)  Avoid taking any nephrotoxic agents (can harm kidneys) - Intravenous contrast for diagnostic testing, combination cold medications.  Have all medications adjusted for your renal function by your Health Care Provider.  Blood pressure control is important.  Take all medication as prescribed.    Diagnosis: Acute sciatica  Assessment and Plan of Treatment: You came in with back pain, weakness, and a fall.  CT scan did not show any fracture.  You were seen by physical therapy and cleared to go home and use a walker.  Sciatica refers to pain that travels along the path of the sciatic nerve. The sciatic nerve travels from the lower back through the hips and buttocks and down each leg.  Sciatica most often occurs when a herniated disk or an overgrowth of bone puts pressure on part of the nerve. This causes inflammation, pain and often some numbness in the affected leg.  Risk factors for sciatica include:  Age. Age-related changes in the spine, such as herniated disks and bone spurs, are the most common causes of sciatica.  Occupation. A job that requires twisting the back, carrying heavy loads or driving a motor vehicle for long periods might play a role in sciatica.  Prolonged sitting. People who sit a lot or don't move much are more likely to develop sciatica than active people are.  Mild sciatica usually goes away over time. Call your primary care provider if self-care measures don't ease symptoms. Also call if pain lasts longer than a week, is severe or gets worse.   Get immediate medical care for:  Sudden, severe pain in the low back or a leg and numbness or muscle weakness in a leg  Pain after a violent injury, such as a traffic accident  Trouble controlling bowels or bladder    Diagnosis: 2019 novel coronavirus disease (COVID-19)  Assessment and Plan of Treatment:     Diagnosis: Charcot's joint of foot  Assessment and Plan of Treatment: You have a charcot's joint of foot, it is a condition that causes a loss of sensation in the extremities. The affected area becomes weakened leading to deforties and joint dislocations. Podiatry followed your and recommended CAM boot/CROW walker. importance of daily foot examinations and proper shoe gear and to importance of lower fasting glucose        PRINCIPAL DISCHARGE DIAGNOSIS  Diagnosis: Multilevel spinal stenosis  Assessment and Plan of Treatment: You came in with back pain, weakness, and a fall.  CT scan did not show any fracture. CT scan showed Multilevel lumbar spondylosis most prominent at L4-5 with mild to moderate spinal canal stenosis and right lateral recess narrowing with moderate to severe right and moderate left neural foraminal narrowing. Moderate right neural foraminal narrowing L2-3, L3-4 and L5-S1.   You were seen by physical therapy and cleared to go home and use a walker.  Sciatica refers to pain that travels along the path of the sciatic nerve. The sciatic nerve travels from the lower back through the hips and buttocks and down each leg.  Sciatica most often occurs when a herniated disk or an overgrowth of bone puts pressure on part of the nerve. This causes inflammation, pain and often some numbness in the affected leg.  Risk factors for sciatica include:  Age. Age-related changes in the spine, such as herniated disks and bone spurs, are the most common causes of sciatica.  Occupation. A job that requires twisting the back, carrying heavy loads or driving a motor vehicle for long periods might play a role in sciatica.  Prolonged sitting. People who sit a lot or don't move much are more likely to develop sciatica than active people are.  Mild sciatica usually goes away over time. Call your primary care provider if self-care measures don't ease symptoms. Also call if pain lasts longer than a week, is severe or gets worse.   Get immediate medical care for:  Sudden, severe pain in the low back or a leg and numbness or muscle weakness in a leg  Pain after a violent injury, such as a traffic accident  Trouble controlling bowels or bladder      SECONDARY DISCHARGE DIAGNOSES  Diagnosis: Acute kidney injury superimposed on CKD  Assessment and Plan of Treatment: You creatinine was elevated due to dehydration   Creatinine improved with IV fluids   Avoid taking (NSAIDs) - (ex: Ibuprofen, Advil, Celebrex, Naprosyn)  Avoid taking any nephrotoxic agents (can harm kidneys) - Intravenous contrast for diagnostic testing, combination cold medications.  Have all medications adjusted for your renal function by your Health Care Provider.  Blood pressure control is important.  Take all medication as prescribed.    Diagnosis: Acute sciatica  Assessment and Plan of Treatment: You came in with back pain, weakness, and a fall.  CT scan did not show any fracture.  You were seen by physical therapy and cleared to go home and use a walker.  Sciatica refers to pain that travels along the path of the sciatic nerve. The sciatic nerve travels from the lower back through the hips and buttocks and down each leg.  Sciatica most often occurs when a herniated disk or an overgrowth of bone puts pressure on part of the nerve. This causes inflammation, pain and often some numbness in the affected leg.  Risk factors for sciatica include:  Age. Age-related changes in the spine, such as herniated disks and bone spurs, are the most common causes of sciatica.  Occupation. A job that requires twisting the back, carrying heavy loads or driving a motor vehicle for long periods might play a role in sciatica.  Prolonged sitting. People who sit a lot or don't move much are more likely to develop sciatica than active people are.  Mild sciatica usually goes away over time. Call your primary care provider if self-care measures don't ease symptoms. Also call if pain lasts longer than a week, is severe or gets worse.   Get immediate medical care for:  Sudden, severe pain in the low back or a leg and numbness or muscle weakness in a leg  Pain after a violent injury, such as a traffic accident  Trouble controlling bowels or bladder    Diagnosis: 2019 novel coronavirus disease (COVID-19)  Assessment and Plan of Treatment:     Diagnosis: Charcot's joint of foot  Assessment and Plan of Treatment: You have a charcot's joint of foot, it is a condition that causes a loss of sensation in the extremities. The affected area becomes weakened leading to deforties and joint dislocations. Podiatry followed your and recommended CAM boot/CROW walker. importance of daily foot examinations and proper shoe gear and to importance of lower fasting glucose       Diagnosis: Kidney stone  Assessment and Plan of Treatment: On your CT scan, you appear to have  kidney stone on the R side. Please follow up with outpatient urologist for further management

## 2023-11-23 NOTE — DISCHARGE NOTE PROVIDER - NSFOLLOWUPCLINICS_GEN_ALL_ED_FT
[facility]  Podiatry/Wound Care  [address] [place] [address]  Phone: [phone]  Fax: [phone]  Follow Up Time: 1 week

## 2023-11-23 NOTE — DISCHARGE NOTE PROVIDER - PROVIDER TOKENS
FREE:[LAST:[Your PCP],PHONE:[(   )    -],FAX:[(   )    -],FOLLOWUP:[1 week],ESTABLISHEDPATIENT:[T]] FREE:[LAST:[Your PCP],PHONE:[(   )    -],FAX:[(   )    -],FOLLOWUP:[1 week],ESTABLISHEDPATIENT:[T]],PROVIDER:[TOKEN:[[id number]],FOLLOWUP:[2 weeks]]

## 2023-11-23 NOTE — CHART NOTE - NSCHARTNOTEFT_GEN_A_CORE
EVENT: Received telephone call from RN that pt is c/o of sore throat    HPI: 68-year-old male, ambulates with a cane, with pmhx  of HTN, HLD, DM, CVA with left-sided weakness in 1/23, TIA x 2,  was brought into the hospital due to two recent falls. Both times patient felt extreme back and leg pain causing him to fall once last night and also this morning in the shower. CT lumbar showed multilevel lumbar spondylosis, L4-L5 spinal stenosis , mid right femoral narrowing  L2-S1. Patient is being admitted for back pain and ambulatory dysfunction.     SUBJECTIVE: "My throat is bothering me. It's sore"    OBJECTIVE:  Vital Signs Last 24 Hrs  T(C): 37.6 (23 Nov 2023 20:42), Max: 38.8 (23 Nov 2023 06:03)  T(F): 99.6 (23 Nov 2023 20:42), Max: 101.9 (23 Nov 2023 06:03)  HR: 72 (23 Nov 2023 20:42) (69 - 75)  BP: 147/76 (23 Nov 2023 20:42) (126/77 - 162/80)  BP(mean): --  RR: 20 (23 Nov 2023 20:42) (18 - 20)  SpO2: 99% (23 Nov 2023 20:42) (97% - 100%)    Parameters below as of 23 Nov 2023 20:42  Patient On (Oxygen Delivery Method): room air      FOCUSED PHYSICAL EXAM:  Neuro: awake, alert, oriented x 3. No neuro deficit  Cardiovascular: Pulses +2 B/L in lower and upper extremities, HR regular, BP stable, No edema.  Respiratory: Respirations regular, unlabored, breath sounds clear B/L.   GI: Abdomen soft, non-tender, positive bowel sounds.  : no bladder distention noted. No complaints at this time.  Skin: Dry, intact, no bruising, no diaphoresis.    LABS:                        12.7   4.66  )-----------( 108      ( 23 Nov 2023 05:30 )             38.7   CARDIAC MARKERS ( 22 Nov 2023 14:20 )  x     / x     / 255 U/L / x     / x        11-23    137  |  107  |  37<H>  ----------------------------<  54<LL>  3.8   |  24  |  1.87<H>    Ca    8.2<L>      23 Nov 2023 05:30  Phos  3.5     11-23  Mg     2.2     11-23    TPro  6.8  /  Alb  3.6  /  TBili  0.5  /  DBili  x   /  AST  56<H>  /  ALT  66<H>  /  AlkPhos  161<H>  11-22      EKG:   IMAGING:    ASSESSMENT/PROBLEM: Sore throat      PLAN:   1. Cepacol Lozenge 1 tab, PO, Q 2 hrs, PRN, sore throat x 3 days ordered  2. Monitor response to treatment  3. Cont present care/treatment  4. Supportive care

## 2023-11-23 NOTE — DISCHARGE NOTE PROVIDER - NSDCMRMEDTOKEN_GEN_ALL_CORE_FT
atorvastatin 80 mg oral tablet: 1 tab(s) orally once a day  clopidogrel 75 mg oral tablet: 1 tab(s) orally once a day  Jardiance 25 mg oral tablet: 1 tab(s) orally once a day  Lantus 100 units/mL subcutaneous solution: 10 unit(s) subcutaneous 2 times a day  losartan 100 mg oral tablet: 1 tab(s) orally once a day  NIFEdipine (Eqv-Adalat CC) 90 mg oral tablet, extended release: 1 tab(s) orally once a day

## 2023-11-23 NOTE — PROGRESS NOTE ADULT - ASSESSMENT
68-year-old male, ambulates with a cane, with pmhx  of HTN, HLD, DM, CVA with left-sided weakness in 1/23, TIA x 2,  was brought into the hospital due to two recent falls. Admit for ambulatory dysfunction and fall 2/2 multilevel lumbar spondylosis and spinal stenosis, ANISA on CKD vs CKD.    # Ambulatory dysfunction and fall 2/2 multilevel lumbar spondylosis and spinal stenosis, ANISA vs CD.  #Fever, likely 2/2 viral URI  #ANISA on CKD vs CKD  #DM  #Microscopic hematuria  #Hx CVA, HTN, HLD  #Charcot's R foot  CT lumbar reviewed and showed multilevel lumbar spondylosis, L4-L5 spinal stenosis , mid right femoral narrowing  L2-S1. Patient is being admitted for back pain and ambulatory dysfunction. Patient unable to ambulate on own in ED.  - Febrile overnight, appears likely 2/2 viral URI per his symptoms    - f/u bcx, ucx - monitor off abx at this time    - Viral RVP    - Tylenol prn    - robutussen/guafenisin PRN  - Tylenol for Pain/fever  - PT consult - home PT, walker  - Podiatry consult for charcot's- f/u xray  - CBC, BMP and UA reviewed - Elevated Cr 2  - Microscopic hematuria on UA, CT showing possible R kidney stone   - IVF hydration and flomax   - bladder renal US  - Unclear baseline for CKD, will monitor Cr and UOP   -hold losartan and monitor  - Continue lantus 10U and SSI - monitor  - DVT ppx.   68-year-old male, ambulates with a cane, with pmhx  of HTN, HLD, DM, CVA with left-sided weakness in 1/23, TIA x 2,  was brought into the hospital due to two recent falls. Admit for ambulatory dysfunction and fall 2/2 multilevel lumbar spondylosis and spinal stenosis, ANISA on CKD vs CKD.    # Ambulatory dysfunction and fall 2/2 multilevel lumbar spondylosis and spinal stenosis, ANISA vs CD.  #Fever, likely 2/2 viral URI  #ANISA on CKD vs CKD  #DM  #Microscopic hematuria  #Hx CVA, HTN, HLD  #Charcot's R foot  CT lumbar reviewed and showed multilevel lumbar spondylosis, L4-L5 spinal stenosis , mid right femoral narrowing  L2-S1. Patient is being admitted for back pain and ambulatory dysfunction. Patient unable to ambulate on own in ED.  - Febrile overnight, appears likely 2/2 viral URI per his symptoms    - f/u bcx, ucx - monitor off abx at this time    - Viral RVP    - Tylenol prn    - robutussen/guafenisin PRN  - Tylenol for Pain/fever  - PT consult - home PT, walker  - Podiatry consult for charcot's- f/u xray  - CBC, BMP and UA reviewed - Elevated Cr 2  - Microscopic hematuria on UA, CT showing possible R kidney stone   - IVF hydration and flomax   - bladder renal US  - Unclear baseline for CKD, will monitor Cr and UOP   -hold losartan and monitor  - decreased lantus 5U and SSI - monitor hypoglycemia  - DVT ppx.

## 2023-11-23 NOTE — DISCHARGE NOTE PROVIDER - CARE PROVIDER_API CALL
Your PCP,   Phone: (   )    -  Fax: (   )    -  Established Patient  Follow Up Time: 1 week   Your PCP,   Phone: (   )    -  Fax: (   )    -  Established Patient  Follow Up Time: 1 week    Berenice Bills  Urology  [address] [place] [address]  Phone: [phone]  Fax: [phone]  Follow Up Time: 2 weeks

## 2023-11-23 NOTE — PHYSICAL THERAPY INITIAL EVALUATION ADULT - PERTINENT HX OF CURRENT PROBLEM, REHAB EVAL
per chart review: "68-year-old male with history of HTN, HLD, DM, CVA with left-sided weakness in 1/23, TIA x 2 admitted s/p fall with inability to get up".

## 2023-11-23 NOTE — DISCHARGE NOTE PROVIDER - ATTENDING DISCHARGE PHYSICAL EXAMINATION:
GENERAL: NAD, well-groomed, well-developed  NEURO: AAOx3, DUSTY b/l, 5/5 b/l UE and 5/5 b/l LE motor strength  LUNG: Lungs clear to auscultation bilaterally, no wheezing, rhonchi, or rales.  HEART: S1, S2, no S3 or S4. Regular rate and rhythm. No murmurs, gallops, or rubs. No JVD  ABDOMEN: Bowel sounds present, abd Soft, Nontender, Nondistended  EXTREMITIES:  2+ Peripheral Pulses b/l, No clubbing, cyanosis, or edema  SKIN: No rashes or lesions

## 2023-11-23 NOTE — CONSULT NOTE ADULT - ASSESSMENT
A:   Charcot arthropathy, right ankle      P:  Pt evaluated and chart reviewed  Vitals reviewed, no leukocytosis  Right foot and ankle x-rays reviewed- pending final read   Patient to be weightbearing as tolerated in CAM boot/CROW walker  Pending discussion for surgical intervention for Charcot ankle with attending  Discussed importance of daily foot examinations and proper shoe gear and to importance of lower Fasting Blood Glucose levels. Strict glycemic control for optimal wound healing.  Podiatry to follow in house.  Patient to follow up at our patient clinic upon discharge to [address] [place] [address], [place] [address]. Call [phone] to make an appointment.   Discussed with Dr. Singh     A:   Charcot arthropathy, right ankle      P:  Pt evaluated and chart reviewed  Vitals reviewed, no leukocytosis  Right foot and ankle x-rays reviewed- pending final read   Patient to be weightbearing as tolerated in CAM boot/CROW walker  Pending discussion for surgical intervention for Charcot ankle with attending  Discussed importance of daily foot examinations and proper shoe gear and to importance of lower Fasting Blood Glucose levels. Strict glycemic control for optimal wound healing.  Podiatry to follow in house.  Patient to follow up at our patient clinic upon discharge to [address] [place] [address], [address] [place] [address]. Call [phone] to make an appointment.   Discussed with Dr. Singh     A:   Charcot arthropathy, right ankle      P:  Pt evaluated and chart reviewed  Vitals reviewed, no leukocytosis  Right foot and ankle x-rays reviewed- pending final read   Patient to be weightbearing as tolerated in CAM boot/CROW walker  Pending discussion for surgical intervention for Charcot ankle with attending  Discussed importance of daily foot examinations and proper shoe gear and to importance of lower Fasting Blood Glucose levels. Strict glycemic control for optimal wound healing.  Podiatry to follow in house.  Patient to follow up at our patient clinic upon discharge to [address] [place] [address], [address] [place] [address]. Call [phone] to make an appointment.   Discussed with Dr. Singh     A:   Charcot arthropathy, right ankle      P:  Pt evaluated and chart reviewed  Vitals reviewed, no leukocytosis  Right foot and ankle x-rays reviewed- pending final read   Patient to be weightbearing as tolerated in CAM boot/CROW walker  Pending discussion for surgical intervention for Charcot ankle with attending  Discussed importance of daily foot examinations and proper shoe gear and to importance of lower Fasting Blood Glucose levels. Strict glycemic control for optimal wound healing.  Podiatry to follow in house.  Patient to follow up at our patient clinic upon discharge to [address] [place] [address], [place] [address]. Call [phone] to make an appointment on a Thursday   Discussed with Dr. Singh     A:   Charcot arthropathy, right ankle      P:  Pt evaluated and chart reviewed  Vitals reviewed, no leukocytosis  Right foot and ankle x-rays reviewed- pending final read   Patient to be weightbearing as tolerated in CAM boot/CROW walker  Pending discussion for surgical intervention for Charcot ankle with attending  Discussed importance of daily foot examinations and proper shoe gear and to importance of lower Fasting Blood Glucose levels. Strict glycemic control for optimal wound healing.  Podiatry to follow in house.  Patient to follow up at our patient clinic upon discharge to [address] [place] [address], Second [place] [address]. Call [phone] to make an appointment on a Thursday   Discussed with Dr. Singh     A:   Charcot arthropathy, right ankle      P:  Pt evaluated and chart reviewed  Vitals reviewed, no leukocytosis  Right foot and ankle x-rays reviewed- pending final read   Patient to be weightbearing as tolerated in CAM boot/CROW walker  Pending discussion for surgical intervention for Charcot ankle with attending  Discussed importance of daily foot examinations and proper shoe gear and to importance of lower Fasting Blood Glucose levels. Strict glycemic control for optimal wound healing.  Podiatry to follow in house.  Patient to follow up at our patient clinic upon discharge to [address] [place] [address], [place] [address]. Call [phone] to make an appointment on a Thursday   Discussed with Dr. Singh

## 2023-11-23 NOTE — CONSULT NOTE ADULT - SUBJECTIVE AND OBJECTIVE BOX
68-year-old male, ambulates with a cane, with pmhx  of HTN, HLD, DM, CVA with left-sided weakness in 1/23, TIA x 2,  was brought into the hospital due to two recent falls. Both times patient felt extreme back and leg pain causing him to fall once last night and also this morning in the shower. Both times he did not hit his head or LOC and guided himself down. Denies any lightheadedness of presyncope symptoms prior or after the fall.  Patient reports the back pain only started two days ago and describes it as mid back pain radiating  to his lower back and  down his legs. He denies any urinary or fecal incontinence or saddle anesthesia. He took Tylenol this am with a little relief.  Patient lives alone in a rented room and daughter is concerned and wants him to enroll in a rehab program.     Of note, patient states that he has been dealing with right foot Charcot for some years. He ambulates with CROW walker and states that he feels uneasy when walking with it, which may have been the cause of his fall. Patient states he would like to consider surgical options for his extremity     Medications acetaminophen     Tablet .. 650 milliGRAM(s) Oral every 6 hours PRN  atorvastatin 80 milliGRAM(s) Oral at bedtime  clopidogrel Tablet 75 milliGRAM(s) Oral daily  dextrose 5% + sodium chloride 0.45%. 1000 milliLiter(s) IV Continuous <Continuous>  enoxaparin Injectable 40 milliGRAM(s) SubCutaneous every 24 hours  insulin glargine Injectable (LANTUS) 5 Unit(s) SubCutaneous at bedtime  insulin lispro (ADMELOG) corrective regimen sliding scale   SubCutaneous Before meals and at bedtime  NIFEdipine XL 90 milliGRAM(s) Oral daily  tamsulosin 0.4 milliGRAM(s) Oral at bedtime    FH,   PMHHTN (hypertension)    HLD (hyperlipidemia)    Cerebrovascular accident (CVA)    DM (diabetes mellitus)       PS    Labs                          12.7   4.66  )-----------( 108      ( 23 Nov 2023 05:30 )             38.7      11-23    137  |  107  |  37<H>  ----------------------------<  54<LL>  3.8   |  24  |  1.87<H>    Ca    8.2<L>      23 Nov 2023 05:30  Phos  3.5     11-23  Mg     2.2     11-23    TPro  6.8  /  Alb  3.6  /  TBili  0.5  /  DBili  x   /  AST  56<H>  /  ALT  66<H>  /  AlkPhos  161<H>  11-22     Vital Signs Last 24 Hrs  T(C): 36.9 (23 Nov 2023 07:10), Max: 38.8 (23 Nov 2023 06:03)  T(F): 98.5 (23 Nov 2023 07:10), Max: 101.9 (23 Nov 2023 06:03)  HR: 74 (23 Nov 2023 09:55) (71 - 76)  BP: 126/77 (23 Nov 2023 09:55) (126/77 - 158/78)  BP(mean): --  RR: 20 (23 Nov 2023 06:03) (18 - 20)  SpO2: 100% (23 Nov 2023 09:55) (95% - 100%)    Parameters below as of 23 Nov 2023 09:55  Patient On (Oxygen Delivery Method): room air      Sedimentation Rate, Erythrocyte: 10 mm/Hr (11-23-23 @ 05:30)         C-Reactive Protein, Serum: 10 mg/L (11-23-23 @ 05:30)   WBC Count: 4.66 K/uL (11-23-23 @ 05:30)  WBC Count: 5.98 K/uL (11-22-23 @ 14:20)      ROS: Unremarkable outside HPI    LE Focused Exam  Vascular: DP 2/4 PT 2/4 b/l. CFT <3 secs x 10. Temp Gradient warm to cool b/l. Pedal hair present. NO edema, NO erythema, NO varicosities  Dermatological: Nails are NORMAL x10, NO interdigital maceration, NO hyperkeratotic lesions, NO clinical signs of infection. -malodor, -ptb, -soft tissue crepitus - fluctuance -soft tissue crepitus  Neurological: Patient is awake, alert and oriented x3. Gross Epicritic sensation is intact   MSK: Grossly deformed with valgus rotation right ankle joint. Muscle strength 4/5 was notes in all compartments of RLE. NO pain on palpation. Arch height 0/5 off-WB, joint ROM limited with crepitation noted to right ankle joint.             68-year-old male, ambulates with a cane, with pmhx  of HTN, HLD, DM, CVA with left-sided weakness in 1/23, TIA x 2,  was brought into the hospital due to two recent falls. Both times patient felt extreme back and leg pain causing him to fall once last night and also this morning in the shower. Both times he did not hit his head or LOC and guided himself down. Denies any lightheadedness of presyncope symptoms prior or after the fall.  Patient reports the back pain only started two days ago and describes it as mid back pain radiating  to his lower back and  down his legs. He denies any urinary or fecal incontinence or saddle anesthesia. He took Tylenol this am with a little relief.  Patient lives alone in a rented room and daughter is concerned and wants him to enroll in a rehab program.     Of note, patient states that he has been dealing with right foot Charcot for some years. He ambulates with CROW walker and states that he feels uneasy when walking with it. Patient states he would like to consider surgical options for his extremity     Medications acetaminophen     Tablet .. 650 milliGRAM(s) Oral every 6 hours PRN  atorvastatin 80 milliGRAM(s) Oral at bedtime  clopidogrel Tablet 75 milliGRAM(s) Oral daily  dextrose 5% + sodium chloride 0.45%. 1000 milliLiter(s) IV Continuous <Continuous>  enoxaparin Injectable 40 milliGRAM(s) SubCutaneous every 24 hours  insulin glargine Injectable (LANTUS) 5 Unit(s) SubCutaneous at bedtime  insulin lispro (ADMELOG) corrective regimen sliding scale   SubCutaneous Before meals and at bedtime  NIFEdipine XL 90 milliGRAM(s) Oral daily  tamsulosin 0.4 milliGRAM(s) Oral at bedtime    FH,   PMHHTN (hypertension)    HLD (hyperlipidemia)    Cerebrovascular accident (CVA)    DM (diabetes mellitus)       [facility]    Labs                          12.7   4.66  )-----------( 108      ( 23 Nov 2023 05:30 )             38.7      11-23    137  |  107  |  37<H>  ----------------------------<  54<LL>  3.8   |  24  |  1.87<H>    Ca    8.2<L>      23 Nov 2023 05:30  Phos  3.5     11-23  Mg     2.2     11-23    TPro  6.8  /  Alb  3.6  /  TBili  0.5  /  DBili  x   /  AST  56<H>  /  ALT  66<H>  /  AlkPhos  161<H>  11-22     Vital Signs Last 24 Hrs  T(C): 36.9 (23 Nov 2023 07:10), Max: 38.8 (23 Nov 2023 06:03)  T(F): 98.5 (23 Nov 2023 07:10), Max: 101.9 (23 Nov 2023 06:03)  HR: 74 (23 Nov 2023 09:55) (71 - 76)  BP: 126/77 (23 Nov 2023 09:55) (126/77 - 158/78)  BP(mean): --  RR: 20 (23 Nov 2023 06:03) (18 - 20)  SpO2: 100% (23 Nov 2023 09:55) (95% - 100%)    Parameters below as of 23 Nov 2023 09:55  Patient On (Oxygen Delivery Method): room air      Sedimentation Rate, Erythrocyte: 10 mm/Hr (11-23-23 @ 05:30)         C-Reactive Protein, Serum: 10 mg/L (11-23-23 @ 05:30)   WBC Count: 4.66 K/uL (11-23-23 @ 05:30)  WBC Count: 5.98 K/uL (11-22-23 @ 14:20)      ROS: Unremarkable outside HPI    LE Focused Exam  Vascular: DP 2/4 PT 2/4 b/l. CFT <3 secs x 10. Temp Gradient warm to cool b/l. Pedal hair present. NO edema, NO erythema, NO varicosities  Dermatological: Nails are NORMAL x10, NO interdigital maceration, NO hyperkeratotic lesions, NO clinical signs of infection. -malodor, -ptb, -soft tissue crepitus - fluctuance -soft tissue crepitus  Neurological: Patient is awake, alert and oriented x3. Gross Epicritic sensation is intact   MSK: Grossly deformed with valgus rotation right ankle joint. Muscle strength 4/5 was notes in all compartments of RLE. NO pain on palpation. Arch height 0/5 off-WB, joint ROM limited with crepitation noted to right ankle joint.             68-year-old male, ambulates with a cane, with pmhx  of HTN, HLD, DM, CVA with left-sided weakness in 1/23, TIA x 2,  was brought into the hospital due to two recent falls. Both times patient felt extreme back and leg pain causing him to fall once last night and also this morning in the shower. Both times he did not hit his head or LOC and guided himself down. Denies any lightheadedness of presyncope symptoms prior or after the fall.  Patient reports the back pain only started two days ago and describes it as mid back pain radiating  to his lower back and  down his legs. He denies any urinary or fecal incontinence or saddle anesthesia. He took Tylenol this am with a little relief.  Patient lives alone in a rented room and daughter is concerned and wants him to enroll in a rehab program.     Of note, patient states that he has been dealing with right foot Charcot for some years. He ambulates with CROW walker and states that he feels uneasy when walking with it. Patient states he would like to consider surgical options for his extremity     Medications acetaminophen     Tablet .. 650 milliGRAM(s) Oral every 6 hours PRN  atorvastatin 80 milliGRAM(s) Oral at bedtime  clopidogrel Tablet 75 milliGRAM(s) Oral daily  dextrose 5% + sodium chloride 0.45%. 1000 milliLiter(s) IV Continuous <Continuous>  enoxaparin Injectable 40 milliGRAM(s) SubCutaneous every 24 hours  insulin glargine Injectable (LANTUS) 5 Unit(s) SubCutaneous at bedtime  insulin lispro (ADMELOG) corrective regimen sliding scale   SubCutaneous Before meals and at bedtime  NIFEdipine XL 90 milliGRAM(s) Oral daily  tamsulosin 0.4 milliGRAM(s) Oral at bedtime    FH,   PMHHTN (hypertension)    HLD (hyperlipidemia)    Cerebrovascular accident (CVA)    DM (diabetes mellitus)       [facility]    Labs                          12.7   4.66  )-----------( 108      ( 23 Nov 2023 05:30 )             38.7      11-23    137  |  107  |  37<H>  ----------------------------<  54<LL>  3.8   |  24  |  1.87<H>    Ca    8.2<L>      23 Nov 2023 05:30  Phos  3.5     11-23  Mg     2.2     11-23    TPro  6.8  /  Alb  3.6  /  TBili  0.5  /  DBili  x   /  AST  56<H>  /  ALT  66<H>  /  AlkPhos  161<H>  11-22     Vital Signs Last 24 Hrs  T(C): 36.9 (23 Nov 2023 07:10), Max: 38.8 (23 Nov 2023 06:03)  T(F): 98.5 (23 Nov 2023 07:10), Max: 101.9 (23 Nov 2023 06:03)  HR: 74 (23 Nov 2023 09:55) (71 - 76)  BP: 126/77 (23 Nov 2023 09:55) (126/77 - 158/78)  BP(mean): --  RR: 20 (23 Nov 2023 06:03) (18 - 20)  SpO2: 100% (23 Nov 2023 09:55) (95% - 100%)    Parameters below as of 23 Nov 2023 09:55  Patient On (Oxygen Delivery Method): room air      Sedimentation Rate, Erythrocyte: 10 mm/Hr (11-23-23 @ 05:30)         C-Reactive Protein, Serum: 10 mg/L (11-23-23 @ 05:30)   WBC Count: 4.66 K/uL (11-23-23 @ 05:30)  WBC Count: 5.98 K/uL (11-22-23 @ 14:20)      ROS: Unremarkable outside HPI    LE Focused Exam  Vascular: DP 2/4 PT 2/4 b/l. CFT <3 secs x 10. Temp Gradient warm to cool b/l. Pedal hair present. NO edema, NO erythema, NO varicosities  Dermatological: Nails are NORMAL x10, NO interdigital maceration, NO hyperkeratotic lesions, NO clinical signs of infection. -malodor, -ptb, -soft tissue crepitus - fluctuance -soft tissue crepitus  Neurological: Patient is awake, alert and oriented x3. Gross Epicritic sensation is intact   MSK: Grossly deformed with valgus rotation right ankle joint. Muscle strength 4/5 was notes in all compartments of RLE. NO pain on palpation. Arch height 0/5 off-WB, joint ROM limited with crepitation noted to right ankle joint.             68-year-old male, ambulates with a cane, with pmhx  of HTN, HLD, DM, CVA with left-sided weakness in 1/23, TIA x 2,  was brought into the hospital due to two recent falls. Both times patient felt extreme back and leg pain causing him to fall once last night and also this morning in the shower. Both times he did not hit his head or LOC and guided himself down. Denies any lightheadedness of presyncope symptoms prior or after the fall.  Patient reports the back pain only started two days ago and describes it as mid back pain radiating  to his lower back and  down his legs. He denies any urinary or fecal incontinence or saddle anesthesia. He took Tylenol this am with a little relief.  Patient lives alone in a rented room and daughter is concerned and wants him to enroll in a rehab program.     Of note, patient states that he has been dealing with right foot Charcot for some years. He ambulates with CROW walker and states that he feels uneasy when walking with it. Patient states he would like to consider surgical options for his extremity     Medications acetaminophen     Tablet .. 650 milliGRAM(s) Oral every 6 hours PRN  atorvastatin 80 milliGRAM(s) Oral at bedtime  clopidogrel Tablet 75 milliGRAM(s) Oral daily  dextrose 5% + sodium chloride 0.45%. 1000 milliLiter(s) IV Continuous <Continuous>  enoxaparin Injectable 40 milliGRAM(s) SubCutaneous every 24 hours  insulin glargine Injectable (LANTUS) 5 Unit(s) SubCutaneous at bedtime  insulin lispro (ADMELOG) corrective regimen sliding scale   SubCutaneous Before meals and at bedtime  NIFEdipine XL 90 milliGRAM(s) Oral daily  tamsulosin 0.4 milliGRAM(s) Oral at bedtime    FH,   PMHHTN (hypertension)    HLD (hyperlipidemia)    Cerebrovascular accident (CVA)    DM (diabetes mellitus)       [facility]    Labs                          12.7   4.66  )-----------( 108      ( 23 Nov 2023 05:30 )             38.7      11-23    137  |  107  |  37<H>  ----------------------------<  54<LL>  3.8   |  24  |  1.87<H>    Ca    8.2<L>      23 Nov 2023 05:30  Phos  3.5     11-23  Mg     2.2     11-23    TPro  6.8  /  Alb  3.6  /  TBili  0.5  /  DBili  x   /  AST  56<H>  /  ALT  66<H>  /  AlkPhos  161<H>  11-22     Vital Signs Last 24 Hrs  T(C): 36.9 (23 Nov 2023 07:10), Max: 38.8 (23 Nov 2023 06:03)  T(F): 98.5 (23 Nov 2023 07:10), Max: 101.9 (23 Nov 2023 06:03)  HR: 74 (23 Nov 2023 09:55) (71 - 76)  BP: 126/77 (23 Nov 2023 09:55) (126/77 - 158/78)  BP(mean): --  RR: 20 (23 Nov 2023 06:03) (18 - 20)  SpO2: 100% (23 Nov 2023 09:55) (95% - 100%)    Parameters below as of 23 Nov 2023 09:55  Patient On (Oxygen Delivery Method): room air      Sedimentation Rate, Erythrocyte: 10 mm/Hr (11-23-23 @ 05:30)         C-Reactive Protein, Serum: 10 mg/L (11-23-23 @ 05:30)   WBC Count: 4.66 K/uL (11-23-23 @ 05:30)  WBC Count: 5.98 K/uL (11-22-23 @ 14:20)      ROS: Unremarkable outside HPI    LE Focused Exam  Vascular: DP 2/4 PT 2/4 b/l. CFT <3 secs x 10. Temp Gradient warm to cool b/l. Pedal hair present. NO edema, NO erythema, NO varicosities  Dermatological: Nails are NORMAL x10, NO interdigital maceration, NO hyperkeratotic lesions, NO clinical signs of infection. -malodor, -ptb, -soft tissue crepitus - fluctuance -soft tissue crepitus  Neurological: Patient is awake, alert and oriented x3. Gross Epicritic sensation is intact   MSK: Grossly deformed with valgus rotation right ankle joint. Muscle strength 4/5 was notes in all compartments of RLE. NO pain on palpation. Arch height 0/5 off-WB, joint ROM limited with crepitation noted to right ankle joint.

## 2023-11-23 NOTE — DISCHARGE NOTE PROVIDER - CARE PROVIDERS DIRECT ADDRESSES
,DirectAddress_Unknown ,DirectAddress_Unknown,[email or web address]

## 2023-11-24 VITALS
TEMPERATURE: 98 F | OXYGEN SATURATION: 99 % | HEART RATE: 77 BPM | RESPIRATION RATE: 16 BRPM | DIASTOLIC BLOOD PRESSURE: 76 MMHG | SYSTOLIC BLOOD PRESSURE: 117 MMHG

## 2023-11-24 LAB
ALBUMIN SERPL ELPH-MCNC: 2.9 G/DL — LOW (ref 3.5–5)
ALBUMIN SERPL ELPH-MCNC: 2.9 G/DL — LOW (ref 3.5–5)
ALP SERPL-CCNC: 133 U/L — HIGH (ref 40–120)
ALP SERPL-CCNC: 133 U/L — HIGH (ref 40–120)
ALT FLD-CCNC: 41 U/L DA — SIGNIFICANT CHANGE UP (ref 10–60)
ALT FLD-CCNC: 41 U/L DA — SIGNIFICANT CHANGE UP (ref 10–60)
ANION GAP SERPL CALC-SCNC: 4 MMOL/L — LOW (ref 5–17)
ANION GAP SERPL CALC-SCNC: 4 MMOL/L — LOW (ref 5–17)
AST SERPL-CCNC: 41 U/L — HIGH (ref 10–40)
AST SERPL-CCNC: 41 U/L — HIGH (ref 10–40)
BILIRUB SERPL-MCNC: 0.5 MG/DL — SIGNIFICANT CHANGE UP (ref 0.2–1.2)
BILIRUB SERPL-MCNC: 0.5 MG/DL — SIGNIFICANT CHANGE UP (ref 0.2–1.2)
BUN SERPL-MCNC: 32 MG/DL — HIGH (ref 7–18)
BUN SERPL-MCNC: 32 MG/DL — HIGH (ref 7–18)
CALCIUM SERPL-MCNC: 7.9 MG/DL — LOW (ref 8.4–10.5)
CALCIUM SERPL-MCNC: 7.9 MG/DL — LOW (ref 8.4–10.5)
CHLORIDE SERPL-SCNC: 108 MMOL/L — SIGNIFICANT CHANGE UP (ref 96–108)
CHLORIDE SERPL-SCNC: 108 MMOL/L — SIGNIFICANT CHANGE UP (ref 96–108)
CO2 SERPL-SCNC: 25 MMOL/L — SIGNIFICANT CHANGE UP (ref 22–31)
CO2 SERPL-SCNC: 25 MMOL/L — SIGNIFICANT CHANGE UP (ref 22–31)
CREAT SERPL-MCNC: 1.29 MG/DL — SIGNIFICANT CHANGE UP (ref 0.5–1.3)
CREAT SERPL-MCNC: 1.29 MG/DL — SIGNIFICANT CHANGE UP (ref 0.5–1.3)
EGFR: 60 ML/MIN/1.73M2 — SIGNIFICANT CHANGE UP
EGFR: 60 ML/MIN/1.73M2 — SIGNIFICANT CHANGE UP
GLUCOSE BLDC GLUCOMTR-MCNC: 129 MG/DL — HIGH (ref 70–99)
GLUCOSE BLDC GLUCOMTR-MCNC: 129 MG/DL — HIGH (ref 70–99)
GLUCOSE BLDC GLUCOMTR-MCNC: 160 MG/DL — HIGH (ref 70–99)
GLUCOSE BLDC GLUCOMTR-MCNC: 160 MG/DL — HIGH (ref 70–99)
GLUCOSE SERPL-MCNC: 130 MG/DL — HIGH (ref 70–99)
GLUCOSE SERPL-MCNC: 130 MG/DL — HIGH (ref 70–99)
HCT VFR BLD CALC: 36.1 % — LOW (ref 39–50)
HCT VFR BLD CALC: 36.1 % — LOW (ref 39–50)
HGB BLD-MCNC: 12.1 G/DL — LOW (ref 13–17)
HGB BLD-MCNC: 12.1 G/DL — LOW (ref 13–17)
MCHC RBC-ENTMCNC: 28.5 PG — SIGNIFICANT CHANGE UP (ref 27–34)
MCHC RBC-ENTMCNC: 28.5 PG — SIGNIFICANT CHANGE UP (ref 27–34)
MCHC RBC-ENTMCNC: 33.5 GM/DL — SIGNIFICANT CHANGE UP (ref 32–36)
MCHC RBC-ENTMCNC: 33.5 GM/DL — SIGNIFICANT CHANGE UP (ref 32–36)
MCV RBC AUTO: 84.9 FL — SIGNIFICANT CHANGE UP (ref 80–100)
MCV RBC AUTO: 84.9 FL — SIGNIFICANT CHANGE UP (ref 80–100)
NRBC # BLD: 0 /100 WBCS — SIGNIFICANT CHANGE UP (ref 0–0)
NRBC # BLD: 0 /100 WBCS — SIGNIFICANT CHANGE UP (ref 0–0)
PLATELET # BLD AUTO: 101 K/UL — LOW (ref 150–400)
PLATELET # BLD AUTO: 101 K/UL — LOW (ref 150–400)
POTASSIUM SERPL-MCNC: 3.7 MMOL/L — SIGNIFICANT CHANGE UP (ref 3.5–5.3)
POTASSIUM SERPL-MCNC: 3.7 MMOL/L — SIGNIFICANT CHANGE UP (ref 3.5–5.3)
POTASSIUM SERPL-SCNC: 3.7 MMOL/L — SIGNIFICANT CHANGE UP (ref 3.5–5.3)
POTASSIUM SERPL-SCNC: 3.7 MMOL/L — SIGNIFICANT CHANGE UP (ref 3.5–5.3)
PROT SERPL-MCNC: 5.7 G/DL — LOW (ref 6–8.3)
PROT SERPL-MCNC: 5.7 G/DL — LOW (ref 6–8.3)
RBC # BLD: 4.25 M/UL — SIGNIFICANT CHANGE UP (ref 4.2–5.8)
RBC # BLD: 4.25 M/UL — SIGNIFICANT CHANGE UP (ref 4.2–5.8)
RBC # FLD: 13.4 % — SIGNIFICANT CHANGE UP (ref 10.3–14.5)
RBC # FLD: 13.4 % — SIGNIFICANT CHANGE UP (ref 10.3–14.5)
SODIUM SERPL-SCNC: 137 MMOL/L — SIGNIFICANT CHANGE UP (ref 135–145)
SODIUM SERPL-SCNC: 137 MMOL/L — SIGNIFICANT CHANGE UP (ref 135–145)
WBC # BLD: 3.63 K/UL — LOW (ref 3.8–10.5)
WBC # BLD: 3.63 K/UL — LOW (ref 3.8–10.5)
WBC # FLD AUTO: 3.63 K/UL — LOW (ref 3.8–10.5)
WBC # FLD AUTO: 3.63 K/UL — LOW (ref 3.8–10.5)

## 2023-11-24 PROCEDURE — 84484 ASSAY OF TROPONIN QUANT: CPT

## 2023-11-24 PROCEDURE — 99239 HOSP IP/OBS DSCHRG MGMT >30: CPT

## 2023-11-24 PROCEDURE — 85025 COMPLETE CBC W/AUTO DIFF WBC: CPT

## 2023-11-24 PROCEDURE — 87040 BLOOD CULTURE FOR BACTERIA: CPT

## 2023-11-24 PROCEDURE — 93005 ELECTROCARDIOGRAM TRACING: CPT

## 2023-11-24 PROCEDURE — 86803 HEPATITIS C AB TEST: CPT

## 2023-11-24 PROCEDURE — 82962 GLUCOSE BLOOD TEST: CPT

## 2023-11-24 PROCEDURE — 85027 COMPLETE CBC AUTOMATED: CPT

## 2023-11-24 PROCEDURE — 99285 EMERGENCY DEPT VISIT HI MDM: CPT | Mod: 25

## 2023-11-24 PROCEDURE — 0225U NFCT DS DNA&RNA 21 SARSCOV2: CPT

## 2023-11-24 PROCEDURE — 36415 COLL VENOUS BLD VENIPUNCTURE: CPT

## 2023-11-24 PROCEDURE — 80048 BASIC METABOLIC PNL TOTAL CA: CPT

## 2023-11-24 PROCEDURE — 96374 THER/PROPH/DIAG INJ IV PUSH: CPT

## 2023-11-24 PROCEDURE — 85652 RBC SED RATE AUTOMATED: CPT

## 2023-11-24 PROCEDURE — 87086 URINE CULTURE/COLONY COUNT: CPT

## 2023-11-24 PROCEDURE — 86140 C-REACTIVE PROTEIN: CPT

## 2023-11-24 PROCEDURE — 83036 HEMOGLOBIN GLYCOSYLATED A1C: CPT

## 2023-11-24 PROCEDURE — 97162 PT EVAL MOD COMPLEX 30 MIN: CPT

## 2023-11-24 PROCEDURE — 82550 ASSAY OF CK (CPK): CPT

## 2023-11-24 PROCEDURE — 82009 KETONE BODYS QUAL: CPT

## 2023-11-24 PROCEDURE — 83735 ASSAY OF MAGNESIUM: CPT

## 2023-11-24 PROCEDURE — 83930 ASSAY OF BLOOD OSMOLALITY: CPT

## 2023-11-24 PROCEDURE — 80053 COMPREHEN METABOLIC PANEL: CPT

## 2023-11-24 PROCEDURE — 72131 CT LUMBAR SPINE W/O DYE: CPT | Mod: MA

## 2023-11-24 PROCEDURE — 81001 URINALYSIS AUTO W/SCOPE: CPT

## 2023-11-24 PROCEDURE — 73620 X-RAY EXAM OF FOOT: CPT

## 2023-11-24 PROCEDURE — 71045 X-RAY EXAM CHEST 1 VIEW: CPT

## 2023-11-24 PROCEDURE — 84100 ASSAY OF PHOSPHORUS: CPT

## 2023-11-24 RX ADMIN — Medication 90 MILLIGRAM(S): at 06:19

## 2023-11-24 RX ADMIN — Medication 1: at 11:42

## 2023-11-24 RX ADMIN — SODIUM CHLORIDE 75 MILLILITER(S): 9 INJECTION, SOLUTION INTRAVENOUS at 01:14

## 2023-11-24 RX ADMIN — CLOPIDOGREL BISULFATE 75 MILLIGRAM(S): 75 TABLET, FILM COATED ORAL at 11:43

## 2023-11-24 NOTE — PROGRESS NOTE ADULT - SUBJECTIVE AND OBJECTIVE BOX
[facility] Medicine  Patient is a 68y old  Male who presents with a chief complaint of Back pain (22 Nov 2023 18:09)      SUBJECTIVE / OVERNIGHT EVENTS:  Patient developed fever overnight but did not feel it. Patient with nasal congestion and sore throat and headache that started overnight. Denies any CP, SOB, abd pain, N/V/D, constipation, or leg swelling.       MEDICATIONS  (STANDING):  atorvastatin 80 milliGRAM(s) Oral at bedtime  clopidogrel Tablet 75 milliGRAM(s) Oral daily  dextrose 5% + sodium chloride 0.45%. 1000 milliLiter(s) (75 mL/Hr) IV Continuous <Continuous>  enoxaparin Injectable 40 milliGRAM(s) SubCutaneous every 24 hours  insulin glargine Injectable (LANTUS) 10 Unit(s) SubCutaneous at bedtime  insulin lispro (ADMELOG) corrective regimen sliding scale   SubCutaneous Before meals and at bedtime  NIFEdipine XL 90 milliGRAM(s) Oral daily  tamsulosin 0.4 milliGRAM(s) Oral at bedtime    MEDICATIONS  (PRN):  acetaminophen     Tablet .. 650 milliGRAM(s) Oral every 6 hours PRN Temp greater or equal to 38C (100.4F), Mild Pain (1 - 3)          OBJECTIVE:  Vital Signs Last 24 Hrs  T(C): 36.9 (23 Nov 2023 07:10), Max: 38.8 (23 Nov 2023 06:03)  T(F): 98.5 (23 Nov 2023 07:10), Max: 101.9 (23 Nov 2023 06:03)  HR: 74 (23 Nov 2023 09:55) (71 - 76)  BP: 126/77 (23 Nov 2023 09:55) (126/77 - 158/78)  BP(mean): --  RR: 20 (23 Nov 2023 06:03) (18 - 20)  SpO2: 100% (23 Nov 2023 09:55) (95% - 100%)    Parameters below as of 23 Nov 2023 09:55  Patient On (Oxygen Delivery Method): room air      PHYSICAL EXAM:  GENERAL: NAD, well-developed  HEAD:  Atraumatic, Normocephalic, nasal congestion  EYES: conjunctiva and sclera clear  NECK: Supple, No JVD  CHEST/LUNG: Clear to auscultation bilaterally; No wheeze  HEART: Regular rate and rhythm; No murmurs, rubs, or gallops  ABDOMEN: Soft, Nontender, Nondistended; Bowel sounds present  EXTREMITIES:  No clubbing, cyanosis, or edema  PSYCH: AAOx3  NEUROLOGY: non-focal  SKIN: No rashes or lesions    CAPILLARY BLOOD GLUCOSE      POCT Blood Glucose.: 115 mg/dL (23 Nov 2023 08:43)  POCT Blood Glucose.: 102 mg/dL (23 Nov 2023 08:09)  POCT Blood Glucose.: 132 mg/dL (22 Nov 2023 21:20)    I&O's Summary            LABS:                        12.7   4.66  )-----------( 108      ( 23 Nov 2023 05:30 )             38.7     11-23    137  |  107  |  37<H>  ----------------------------<  54<LL>  3.8   |  24  |  1.87<H>    Ca    8.2<L>      23 Nov 2023 05:30  Phos  3.5     11-23  Mg     2.2     11-23    TPro  6.8  /  Alb  3.6  /  TBili  0.5  /  DBili  x   /  AST  56<H>  /  ALT  66<H>  /  AlkPhos  161<H>  11-22      CARDIAC MARKERS ( 22 Nov 2023 14:20 )  x     / x     / 255 U/L / x     / x          Urinalysis Basic - ( 23 Nov 2023 05:30 )    Color: x / Appearance: x / SG: x / pH: x  Gluc: 54 mg/dL / Ketone: x  / Bili: x / Urobili: x   Blood: x / Protein: x / Nitrite: x   Leuk Esterase: x / RBC: x / WBC x   Sq Epi: x / Non Sq Epi: x / Bacteria: x            RADIOLOGY & ADDITIONAL TESTS:      
Interval: Patient was seen resting bedside. patient states he is doing well and denies any pain to his right foot. Patient denies any other pedal complaints at this time.      HPI:68-year-old male, ambulates with a cane, with pmhx  of HTN, HLD, DM, CVA with left-sided weakness in 1/23, TIA x 2,  was brought into the hospital due to two recent falls. Both times patient felt extreme back and leg pain causing him to fall once last night and also this morning in the shower. Both times he did not hit his head or LOC and guided himself down. Denies any lightheadedness of presyncope symptoms prior or after the fall.  Patient reports the back pain only started two days ago and describes it as mid back pain radiating  to his lower back and  down his legs. He denies any urinary or fecal incontinence or saddle anesthesia. He took Tylenol this am with a little relief.  Patient lives alone in a rented room and daughter is concerned and wants him to enroll in a rehab program.     Of note, patient states that he has been dealing with right foot Charcot for some years. He ambulates with CROW walker and states that he feels uneasy when walking with it. Patient states he would like to consider surgical options for his extremity     Medications acetaminophen     Tablet .. 650 milliGRAM(s) Oral every 6 hours PRN  atorvastatin 80 milliGRAM(s) Oral at bedtime  benzocaine/menthol Lozenge 1 Lozenge Oral every 2 hours PRN  clopidogrel Tablet 75 milliGRAM(s) Oral daily  dextrose 5% + sodium chloride 0.45%. 1000 milliLiter(s) IV Continuous <Continuous>  enoxaparin Injectable 40 milliGRAM(s) SubCutaneous every 24 hours  insulin glargine Injectable (LANTUS) 5 Unit(s) SubCutaneous at bedtime  insulin lispro (ADMELOG) corrective regimen sliding scale   SubCutaneous Before meals and at bedtime  NIFEdipine XL 90 milliGRAM(s) Oral daily  tamsulosin 0.4 milliGRAM(s) Oral at bedtime    FH,   PMHHTN (hypertension)    HLD (hyperlipidemia)    Cerebrovascular accident (CVA)    DM (diabetes mellitus)       PS    Labs                          12.1   3.63  )-----------( 101      ( 24 Nov 2023 05:30 )             36.1      11-24    137  |  108  |  32<H>  ----------------------------<  130<H>  3.7   |  25  |  1.29    Ca    7.9<L>      24 Nov 2023 05:30  Phos  3.5     11-23  Mg     2.2     11-23    TPro  5.7<L>  /  Alb  2.9<L>  /  TBili  0.5  /  DBili  x   /  AST  41<H>  /  ALT  41  /  AlkPhos  133<H>  11-24     Vital Signs Last 24 Hrs  T(C): 37.7 (24 Nov 2023 06:08), Max: 37.7 (24 Nov 2023 04:40)  T(F): 99.8 (24 Nov 2023 06:08), Max: 99.8 (24 Nov 2023 04:40)  HR: 74 (24 Nov 2023 06:08) (72 - 74)  BP: 136/74 (24 Nov 2023 06:08) (136/74 - 147/76)  BP(mean): --  RR: 18 (24 Nov 2023 06:08) (18 - 20)  SpO2: 96% (24 Nov 2023 06:08) (96% - 99%)    Parameters below as of 24 Nov 2023 06:08  Patient On (Oxygen Delivery Method): room air      Sedimentation Rate, Erythrocyte: 10 mm/Hr (11-23-23 @ 05:30)         C-Reactive Protein, Serum: 10 mg/L (11-23-23 @ 05:30)   WBC Count: 3.63 K/uL *L* (11-24-23 @ 05:30)      ROS: Unremarkable outside HPI    LE Focused Exam  Vascular: DP 2/4 PT 2/4 b/l. CFT <3 secs x 10. Temp Gradient warm to cool b/l. Pedal hair present. NO edema, NO erythema, NO varicosities  Dermatological: Nails are NORMAL x10, NO interdigital maceration, NO hyperkeratotic lesions, NO clinical signs of infection. -malodor, -ptb, -soft tissue crepitus - fluctuance -soft tissue crepitus  Neurological: Patient is awake, alert and oriented x3. Gross Epicritic sensation is intact   MSK: Grossly deformed with valgus rotation right ankle joint. Muscle strength 4/5 was notes in all compartments of RLE. NO pain on palpation. Arch height 0/5 off-WB, joint ROM limited with crepitation noted to right ankle joint.     < from: Xray Foot AP + Lateral, Right (11.22.23 @ 21:49) >    INTERPRETATION:  XR FOOT 2 VIEWS RIGHT    Clinical History: Pain. Recent falls. History of Charcot foot    AP, lateral and oblique view ofthe right foot      FINDINGS:    There is Charcot changes of the hindfoot and ankle with disassociation at   the talonavicular joint. No acute fracture, dislocation, radiopaque   foreign body or subcutaneous emphysema. No gross plain film evidence of  osteomyelitis. Moderate atherosclerotic change. Moderate plantar and   retrocalcaneal spurring.    IMPRESSION:  1.  There is Charcot changes of the hindfoot and ankle with   disassociation at the talonavicular joint.  2.  Consider CT for further evaluation.    --- End of Report ---      < end of copied text >          
No acute events overnight.  COVID test from yesterday came back positive.  No further fevers. No SOB.  Denies sore throat (he complained of one overnight).  No cough.  Feels well and wants to go home.  Daughter at bedside and helps with groceries, pt needs walker and requesting shower chair.    Allergies    No Known Allergies    Intolerances      Vital Signs Last 24 Hrs  T(C): 36.9 (24 Nov 2023 13:03), Max: 37.7 (24 Nov 2023 04:40)  T(F): 98.4 (24 Nov 2023 13:03), Max: 99.8 (24 Nov 2023 04:40)  HR: 77 (24 Nov 2023 13:03) (72 - 77)  BP: 117/76 (24 Nov 2023 13:03) (117/76 - 147/76)  BP(mean): --  RR: 16 (24 Nov 2023 13:03) (16 - 20)  SpO2: 99% (24 Nov 2023 13:03) (96% - 99%)    Parameters below as of 24 Nov 2023 13:03  Patient On (Oxygen Delivery Method): room air        MedsMEDICATIONS  (STANDING):  atorvastatin 80 milliGRAM(s) Oral at bedtime  clopidogrel Tablet 75 milliGRAM(s) Oral daily  dextrose 5% + sodium chloride 0.45%. 1000 milliLiter(s) (75 mL/Hr) IV Continuous <Continuous>  enoxaparin Injectable 40 milliGRAM(s) SubCutaneous every 24 hours  insulin glargine Injectable (LANTUS) 5 Unit(s) SubCutaneous at bedtime  insulin lispro (ADMELOG) corrective regimen sliding scale   SubCutaneous Before meals and at bedtime  NIFEdipine XL 90 milliGRAM(s) Oral daily  tamsulosin 0.4 milliGRAM(s) Oral at bedtime    MEDICATIONS  (PRN):  acetaminophen     Tablet .. 650 milliGRAM(s) Oral every 6 hours PRN Temp greater or equal to 38C (100.4F), Mild Pain (1 - 3)  benzocaine/menthol Lozenge 1 Lozenge Oral every 2 hours PRN Sore Throat      PHYSICAL EXAM:  GENERAL: NAD, well-groomed, well-developed  NEURO: AAOx3, DUSTY b/l, 5/5 b/l UE and 5/5 b/l LE motor strength  LUNG: Lungs clear to auscultation bilaterally, no wheezing, rhonchi, or rales.  HEART: S1, S2, no S3 or S4. Regular rate and rhythm. No murmurs, gallops, or rubs. No JVD  ABDOMEN: Bowel sounds present, abd Soft, Nontender, Nondistended  EXTREMITIES:  2+ Peripheral Pulses b/l, No clubbing, cyanosis, or edema  SKIN: No rashes or lesions    Consultant(s) Notes Reviewed:  [x ] YES  [ ] NO  Care Discussed with Consultants/Other Providers [ x] YES  [ ] NO    LABS:                        12.1   3.63  )-----------( 101      ( 24 Nov 2023 05:30 )             36.1     11-24    137  |  108  |  32<H>  ----------------------------<  130<H>  3.7   |  25  |  1.29    Ca    7.9<L>      24 Nov 2023 05:30  Phos  3.5     11-23  Mg     2.2     11-23    TPro  5.7<L>  /  Alb  2.9<L>  /  TBili  0.5  /  DBili  x   /  AST  41<H>  /  ALT  41  /  AlkPhos  133<H>  11-24        RADIOLOGY & ADDITIONAL TESTS:    EKG:

## 2023-11-24 NOTE — PROGRESS NOTE ADULT - PROBLEM SELECTOR PLAN 4
has a boot on due to Charcot arthropathy on the right foot/leg.   consulted podiatry as he has not followed up with his podiatrist in a while.  rest see above Normal contour; nontender; liver palpable < 2 cm below rib margin, with sharp edge; adequate bowel sound pattern for age; no bruits; spleen tip absent or slightly below rib margin; kidney size and shape, if palpable is acceptable; abdominal distention and masses absent; abdominal wall defects absent; scaphoid abdomen absent; umbilicus with 3 vessels, normal color size, and texture.

## 2023-11-24 NOTE — PROGRESS NOTE ADULT - PROBLEM SELECTOR PLAN 1
Ct lumbar: multilevel lumbar spondylosis, L4-L5 spinal stenosis , mid right femoral narrowing  L2-S1.   PT consulted and rec home using walker, home PT  Case management following, wrote script for walker and shower chair  c/w Tylenol for pain

## 2023-11-24 NOTE — PROGRESS NOTE ADULT - PROBLEM SELECTOR PLAN 5
BUN 33 and creatin 2.07, improved to 1.29 with IVF  unknown duration of abnormal kidney function.    Was told at his primary care doctor last time that his kidney levels were elevated.   Is seeing his primary next month.   avoid nephrotoxic agents.   monitor bmp

## 2023-11-24 NOTE — PROGRESS NOTE ADULT - PROBLEM SELECTOR PLAN 9
takes Jardiance and 10 units of lanatus at night and morning  started on 10 units of lanatus at night and sliding scale  monitor sugars

## 2023-11-24 NOTE — PROGRESS NOTE ADULT - ASSESSMENT
A:   Charcot arthropathy, right ankle      P:  Pt evaluated and chart reviewed  Vitals reviewed, no leukocytosis  Right foot and ankle x-rays reviewed see above notes   Patient to be weightbearing as tolerated in CAM boot/CROW walker  Pending discussion for surgical intervention for Charcot ankle with attending  Discussed importance of daily foot examinations and proper shoe gear and to importance of lower Fasting Blood Glucose levels. Strict glycemic control for optimal wound healing.  Podiatry to follow in house.  Patient to follow up at our patient clinic upon discharge to [address] [place] [address], [address], [place] [address]. Call [phone] to make an appointment on a Thursday   Discussed with Dr. Singh     A:   Charcot arthropathy, right ankle      P:  Pt evaluated and chart reviewed  Vitals reviewed, no leukocytosis  Right foot and ankle x-rays reviewed see above notes   Patient to be weightbearing as tolerated in CAM boot/CROW walker  Pending discussion for surgical intervention for Charcot ankle with attending  Discussed importance of daily foot examinations and proper shoe gear and to importance of lower Fasting Blood Glucose levels. Strict glycemic control for optimal wound healing.  Podiatry to follow in house.  Patient to follow up at our patient clinic upon discharge to [address] [place] [address], [address], [place] [address]. Call [phone] to make an appointment on a Thursday   Discussed with Dr. Singh     A:   Charcot arthropathy, right ankle      P:  Pt evaluated and chart reviewed  Vitals reviewed, no leukocytosis  Right foot and ankle x-rays reviewed see above notes   Patient to be weightbearing as tolerated in CAM boot/CROW walker  Pending discussion for surgical intervention for Charcot ankle with attending  Discussed importance of daily foot examinations and proper shoe gear and to importance of lower Fasting Blood Glucose levels. Strict glycemic control for optimal wound healing.  Podiatry to follow in house.  Patient to follow up at our patient clinic upon discharge to [address] [place] [address], [address], [place] [address]. Call [phone] to make an appointment on a Thursday   Discussed with Dr. Singh

## 2023-11-24 NOTE — DISCHARGE NOTE NURSING/CASE MANAGEMENT/SOCIAL WORK - PATIENT PORTAL LINK FT
You can access the FollowMyHealth Patient Portal offered by [facility] by registering at the following website: [email or web address]. By joining [facility]’s FollowMyHealth portal, you will also be able to view your health information using other applications (apps) compatible with our system.

## 2023-11-28 LAB
CULTURE RESULTS: SIGNIFICANT CHANGE UP
SPECIMEN SOURCE: SIGNIFICANT CHANGE UP

## 2024-01-08 NOTE — ED ADULT TRIAGE NOTE - STATUS:
Reminder call placed to pt for MPI and ECHO. Pt stated needed to cancel and will reschedule sometime in March.   Applied

## 2025-01-31 NOTE — PHYSICAL THERAPY INITIAL EVALUATION ADULT - PREDICTED DURATION OF THERAPY (DAYS/WKS), PT EVAL
Patient requests all Lab, Cardiology, and Radiology Results on their Discharge Instructions 1-2 weeks

## 2025-02-24 NOTE — ED ADULT NURSE NOTE - FINAL NURSING ELECTRONIC SIGNATURE
left upper quadrant/right upper quadrant/left lower quadrant/right lower quadrant/periumbilical/umbilical/suprapubic/left costovertebral angle/right costovertebral angle
22-Nov-2023 20:02